# Patient Record
Sex: MALE | Race: ASIAN | NOT HISPANIC OR LATINO | Employment: FULL TIME | URBAN - METROPOLITAN AREA
[De-identification: names, ages, dates, MRNs, and addresses within clinical notes are randomized per-mention and may not be internally consistent; named-entity substitution may affect disease eponyms.]

---

## 2017-09-16 ENCOUNTER — TRANSFERRED RECORDS (OUTPATIENT)
Dept: HEALTH INFORMATION MANAGEMENT | Facility: CLINIC | Age: 32
End: 2017-09-16

## 2018-01-22 ENCOUNTER — TRANSFERRED RECORDS (OUTPATIENT)
Dept: HEALTH INFORMATION MANAGEMENT | Facility: CLINIC | Age: 33
End: 2018-01-22

## 2018-03-13 ENCOUNTER — OFFICE VISIT (OUTPATIENT)
Dept: URGENT CARE | Facility: URGENT CARE | Age: 33
End: 2018-03-13
Payer: COMMERCIAL

## 2018-03-13 VITALS
TEMPERATURE: 98.5 F | SYSTOLIC BLOOD PRESSURE: 120 MMHG | DIASTOLIC BLOOD PRESSURE: 72 MMHG | OXYGEN SATURATION: 98 % | HEART RATE: 77 BPM

## 2018-03-13 DIAGNOSIS — R07.0 THROAT PAIN: Primary | ICD-10-CM

## 2018-03-13 LAB
DEPRECATED S PYO AG THROAT QL EIA: ABNORMAL
SPECIMEN SOURCE: ABNORMAL

## 2018-03-13 PROCEDURE — 99214 OFFICE O/P EST MOD 30 MIN: CPT | Performed by: FAMILY MEDICINE

## 2018-03-13 PROCEDURE — 87880 STREP A ASSAY W/OPTIC: CPT | Performed by: FAMILY MEDICINE

## 2018-03-13 NOTE — LETTER
[facility]  [address] [facility] [address]  Phone: [phone]  Fax: [phone]    March 15, 2018        Darien Buitrago  [address] [facility] [address]          To whom it may concern:    RE: Darien Buitrago    Patient was seen and treated today at our clinic. Unable to work on 3/13 and 3/14/18.    Please contact me for questions or concerns.      Sincerely,        Monster Choi MD

## 2018-03-13 NOTE — PROGRESS NOTES
SUBJECTIVE:   Darien Buitrago is a 32 year old male presenting with a chief complaint of   Chief Complaint   Patient presents with     Urgent Care     Pharyngitis     Possible strep x2- chills, feels like there is something in the throat, fatigue       He is an established patient of [place].    Onset of symptoms was several weeks week(s) ago.  Course of illness is same.    Severity moderate  Current and Associated symptoms: fever, chills, cough - non-productive and sore throat  Treatment measures tried include Tylenol/Ibuprofen.  Predisposing factors include hx of strep.        Review of Systems   Constitutional: Positive for activity change, appetite change and fever.   HENT: Positive for sinus pressure and sore throat.    Respiratory: Positive for cough.    Cardiovascular: Negative.    Gastrointestinal: Negative.    Endocrine: Negative.        No past medical history on file.  Family History   Problem Relation Age of Onset     CEREBROVASCULAR DISEASE Paternal Grandfather      Current Outpatient Prescriptions   Medication Sig Dispense Refill     cefUROXime (CEFTIN) 500 MG tablet Take 1 tablet (500 mg) by mouth 2 times daily (Patient not taking: Reported on 3/13/2018) 14 tablet 0     Social History   Substance Use Topics     Smoking status: Never Smoker     Smokeless tobacco: Never Used     Alcohol use Yes      Comment: very rarely       OBJECTIVE  /72 (BP Location: Right arm, Patient Position: Chair, Cuff Size: Adult Regular)  Pulse 77  Temp 98.5  F (36.9  C) (Oral)  SpO2 98%    Physical Exam   Constitutional: He is oriented to person, place, and time. He appears well-developed.   HENT:   Head: Normocephalic and atraumatic.   Inferior turbs enlarged , oral is very red in the posterior.    Eyes: Conjunctivae and EOM are normal. Pupils are equal, round, and reactive to light.   Neck: Normal range of motion. No thyromegaly present.   Cardiovascular: Normal rate and regular rhythm.    Pulmonary/Chest: Effort  normal and breath sounds normal.   Abdominal: Soft. Bowel sounds are normal.   Musculoskeletal: Normal range of motion.   Lymphadenopathy:     He has cervical adenopathy.   Neurological: He is alert and oriented to person, place, and time. He has normal reflexes.   Skin: Skin is warm. There is erythema.       Labs:  Results for orders placed or performed in visit on 03/13/18   Rapid strep screen   Result Value Ref Range    Specimen Description Throat     Rapid Strep A Screen (A)      POSITIVE: Group A Streptococcal antigen detected by immunoassay.       X-Ray was not done.    ASSESSMENT:      ICD-10-CM    1. Throat pain R07.0 Rapid strep screen      2. Strep pharyngitis  3. Recurrent infection of strep  4. He has several medical concerns. He does not have a primary . We did discuss that he should be seen .   Medical Decision Making:    Differential Diagnosis:  URI Adult/Peds:  Croup and Sinusitis    Serious Comorbid Conditions:  Adult:  hx of strep    PLAN:    URI Adult:  Tylenol, Ibuprofen and Rx strep  augmentin    Followup:    If not improving or if condition worsens, follow up with your Primary Care Provider    There are no Patient Instructions on file for this visit.    25 minutes in exam and counseling. With greater than 50% of time in discussion of recurrent infection and need for PCP.

## 2018-03-13 NOTE — LETTER
[facility]  [address] [facility] [address]  Phone: [phone]  Fax: [phone]    March 13, 2018        Darien Buitrago  [address] [facility] [address]          To whom it may concern:    RE: Darien Buitrago    Patient was seen and treated today at our clinic. Unable to work on 3/13/18.    Please contact me for questions or concerns.      Sincerely,        Monster Choi MD

## 2018-03-14 ENCOUNTER — TELEPHONE (OUTPATIENT)
Dept: URGENT CARE | Facility: URGENT CARE | Age: 33
End: 2018-03-14

## 2018-03-14 NOTE — TELEPHONE ENCOUNTER
Wife calling to request letter be written for work missing on 3/13/18 and 3/14/18.  They would like to  andrew.  Informed Dr Choi is not in office but there is another provider.    Please advise on re-writing letter.    Renu Watkins RN, BSN

## 2018-03-15 ASSESSMENT — ENCOUNTER SYMPTOMS
FEVER: 1
CARDIOVASCULAR NEGATIVE: 1
GASTROINTESTINAL NEGATIVE: 1
SINUS PRESSURE: 1
COUGH: 1
ACTIVITY CHANGE: 1
APPETITE CHANGE: 1
SORE THROAT: 1
ENDOCRINE NEGATIVE: 1

## 2018-04-12 ENCOUNTER — OFFICE VISIT (OUTPATIENT)
Dept: URGENT CARE | Facility: URGENT CARE | Age: 33
End: 2018-04-12
Payer: COMMERCIAL

## 2018-04-12 VITALS
BODY MASS INDEX: 45.89 KG/M2 | HEIGHT: 63 IN | WEIGHT: 259 LBS | DIASTOLIC BLOOD PRESSURE: 72 MMHG | OXYGEN SATURATION: 98 % | SYSTOLIC BLOOD PRESSURE: 118 MMHG | RESPIRATION RATE: 16 BRPM | TEMPERATURE: 97.9 F | HEART RATE: 75 BPM

## 2018-04-12 DIAGNOSIS — H66.001 ACUTE SUPPURATIVE OTITIS MEDIA OF RIGHT EAR WITHOUT SPONTANEOUS RUPTURE OF TYMPANIC MEMBRANE, RECURRENCE NOT SPECIFIED: Primary | ICD-10-CM

## 2018-04-12 PROCEDURE — 99214 OFFICE O/P EST MOD 30 MIN: CPT | Performed by: NURSE PRACTITIONER

## 2018-04-12 RX ORDER — AMOXICILLIN 500 MG/1
500 CAPSULE ORAL 2 TIMES DAILY
Qty: 20 CAPSULE | Refills: 0 | Status: SHIPPED | OUTPATIENT
Start: 2018-04-12 | End: 2018-05-21

## 2018-04-12 NOTE — LETTER
Return to  Work Release    Date: 4/12/2018      Name: Darien Buitrago                       YOB: 1985      The patient was seen at: [facility], may return to work on 4/16/2018.          _________________________  ARACELI Tapia CNP

## 2018-04-12 NOTE — MR AVS SNAPSHOT
After Visit Summary   4/12/2018    Darien Buitrago    MRN: [id number]           Patient Information     Date Of Birth          1985        Visit Information        Provider Department      4/12/2018 6:35 PM Natividad Lawson APRN [facility]        Today's Diagnoses     Acute suppurative otitis media of right ear without spontaneous rupture of tympanic membrane, recurrence not specified    -  1      Care Instructions      Middle Ear Infection (Adult)  You have an infection of the middle ear, the space behind the eardrum. This is also called acute otitis media (AOM). Sometimes it is caused by the common cold. This is because congestion can block the internal passage (eustachian tube) that drains fluid from the middle ear. When the middle ear fills with fluid, bacteria can grow there and cause an infection. Oral antibiotics are used to treat this illness, not ear drops. Symptoms usually start to improve within 1 to 2 days of treatment.    Home care  The following are general care guidelines:    Finish all of the antibiotic medicine given, even though you may feel better after the first few days.    You may use over-the-counter medicine, such as acetaminophen or ibuprofen, to control pain and fever, unless something else was prescribed. If you have chronic liver or kidney disease or have ever had a stomach ulcer or gastrointestinal bleeding, talk with your healthcare provider before using these medicines. Do not give aspirin to anyone under 18 years of age who has a fever. It may cause severe illness or death.  Follow-up care  Follow up with your healthcare provider, or as advised, in 2 weeks if all symptoms have not gotten better, or if hearing doesn't go back to normal within 1 month.  When to seek medical advice  Call your healthcare provider right away if any of these occur:    Ear pain gets worse or does not improve after 3 days of treatment    Unusual drowsiness or  "confusion    Neck pain, stiff neck, or headache    Fluid or blood draining from the ear canal    Fever of 100.4 F (38 C) or as advised     Seizure  Date Last Reviewed: 2016-2017 [employer]. [address] [place] [address]. All rights reserved. This information is not intended as a substitute for professional medical care. Always follow your healthcare professional's instructions.                Follow-ups after your visit        Follow-up notes from your care team     Return if symptoms worsen or fail to improve.      Who to contact     If you have questions or need follow up information about today's clinic visit or your schedule please contact [facility] directly at [phone].  Normal or non-critical lab and imaging results will be communicated to you by [employer], letter or phone within 4 business days after the clinic has received the results. If you do not hear from us within 7 days, please contact the clinic through [employer]ob[employer] or phone. If you have a critical or abnormal lab result, we will notify you by phone as soon as possible.  Submit refill requests through [email or web address] or call your pharmacy and they will forward the refill request to us. Please allow 3 business days for your refill to be completed.          Additional Information About Your Visit        PlayMob[employer]art Information     [email or web address] lets you send messages to your doctor, view your test results, renew your prescriptions, schedule appointments and more. To sign up, go to [email or web address] . Click on \"Log in\" on the left side of the screen, which will take you to the Welcome page. Then click on \"Sign up Now\" on the right side of the page.     You will be asked to enter the access code listed below, as well as some personal information. Please follow the directions to create your username and password.     Your access code is: [email or web address]  Expires: 2018  7:31 PM     Your access code will  " "in 90 days. If you need help or a new code, please call your [place] clinic or [phone].        Care EveryWhere ID     This is your Care EveryWhere ID. This could be used by other organizations to access your [place] medical records  [id number]        Your Vitals Were     Pulse Temperature Respirations Height Pulse Oximetry BMI (Body Mass Index)    75 97.9  F (36.6  C) (Oral) 16 5' 3\" (1.6 m) 98% 45.88 kg/m2       Blood Pressure from Last 3 Encounters:   04/12/18 118/72   03/13/18 120/72   02/25/15 124/71    Weight from Last 3 Encounters:   04/12/18 259 lb (117.5 kg)   02/25/15 259 lb (117.5 kg)   04/13/09 205 lb (93 kg)              Today, you had the following     No orders found for display         Today's Medication Changes          These changes are accurate as of 4/12/18  7:31 PM.  If you have any questions, ask your nurse or doctor.               Start taking these medicines.        Dose/Directions    amoxicillin 500 MG capsule   Commonly known as:  AMOXIL   Used for:  Acute suppurative otitis media of right ear without spontaneous rupture of tympanic membrane, recurrence not specified        Dose:  500 mg   Take 1 capsule (500 mg) by mouth 2 times daily   Quantity:  20 capsule   Refills:  0            Where to get your medicines      These medications were sent to [facility] [id number] [place] [address] [id number] [place] [address] AT [address]  [address] [facility], [facility] [address]     Phone:  [phone]     amoxicillin 500 MG capsule                Primary Care Provider Fax #    Provider Not In System [phone]                Equal Access to Services     [facility] AH: Hadii feli Mendoza, wanevada luqadaha, qaybta kaaljodie harrison. So [facility] [phone].    ATENCIÓN: Si habla español, tiene a aggarwal disposición servicios gratuitos de asistencia lingüística. Llame al [phone].    We comply with applicable federal civil rights laws " and [place] laws. We do not discriminate on the basis of race, color, national origin, age, disability, sex, sexual orientation, or gender identity.            Thank you!     Thank you for choosing [facility]  for your care. Our goal is always to provide you with excellent care. Hearing back from our patients is one way we can continue to improve our services. Please take a few minutes to complete the written survey that you may receive in the mail after your visit with us. Thank you!             Your Updated Medication List - Protect others around you: Learn how to safely use, store and throw away your medicines at www.disposemymeds.org.          This list is accurate as of 4/12/18  7:31 PM.  Always use your most recent med list.                   Brand Name Dispense Instructions for use Diagnosis    amoxicillin 500 MG capsule    AMOXIL    20 capsule    Take 1 capsule (500 mg) by mouth 2 times daily    Acute suppurative otitis media of right ear without spontaneous rupture of tympanic membrane, recurrence not specified

## 2018-04-12 NOTE — NURSING NOTE
"Chief Complaint   Patient presents with     Ear Problem     right ear pain, pressure on right side, jaw pain, some dizziness, tight chest, slight ST, cold sore on lip and nose, cough x today,        Initial /72 (BP Location: Right arm, Patient Position: Chair, Cuff Size: Adult Large)  Pulse 75  Temp 97.9  F (36.6  C) (Oral)  Resp 16  Ht 5' 3\" (1.6 m)  Wt 259 lb (117.5 kg)  SpO2 98%  BMI 45.88 kg/m2 Estimated body mass index is 45.88 kg/(m^2) as calculated from the following:    Height as of this encounter: 5' 3\" (1.6 m).    Weight as of this encounter: 259 lb (117.5 kg).  Medication Reconciliation: julia Lucas CMA      "

## 2018-04-13 NOTE — PROGRESS NOTES
"SUBJECTIVE: Darien Buitrago is a 33 year old male with 5 days of nasal congestion, cough with new onset 1 day of right sided ear pain. He does endorse ill contacts tho home with 2 children ill. Denies any OTC medications. Denies any fevers or chills. Reports mild fatigue, but no changes in appetite, sleep, bowel or bladder habits. Patient was last treated with antibiotics about 2 weeks previous for strep pharyngitis.     OBJECTIVE:  /72 (BP Location: Right arm, Patient Position: Chair, Cuff Size: Adult Large)  Pulse 75  Temp 97.9  F (36.6  C) (Oral)  Resp 16  Ht 5' 3\" (1.6 m)  Wt 259 lb (117.5 kg)  SpO2 98%  BMI 45.88 kg/m2   General: Alert, oriented in no acute distress  Ears:RIGHT TM dull, mucoid fluid with erythema, LEFT TM clear with good light reflex, with serous fluid no erythema; canals clear without erythema.  Nasal: Bilateral congestion with thin clear mucus Oropharynx: Mild pharyngeal erythema, no tonsilar exudate   Neck: Supple with no adenopathy.   CV: S1, S2 auscultated with regular rate and rhythm, no gallops, murmurs or rubs   Lungs: Bilateral lobes clear throughout without wheezes, crackles, or rhonchi noted    ASSESSMENT: RIGHT sided Otitis Media    PLAN: Discussed ear infection diagnosis, plan and treatment with antibiotics, advised completion of full course and follow up if symptoms do not improve past 3 days. OTC tylenol or NSAIDs as needed for pain or fever. Letter for work provided. Patient agreed to the plan of care.       Dena Lawson, APRN, CNP        "

## 2018-04-13 NOTE — PATIENT INSTRUCTIONS
Middle Ear Infection (Adult)  You have an infection of the middle ear, the space behind the eardrum. This is also called acute otitis media (AOM). Sometimes it is caused by the common cold. This is because congestion can block the internal passage (eustachian tube) that drains fluid from the middle ear. When the middle ear fills with fluid, bacteria can grow there and cause an infection. Oral antibiotics are used to treat this illness, not ear drops. Symptoms usually start to improve within 1 to 2 days of treatment.    Home care  The following are general care guidelines:    Finish all of the antibiotic medicine given, even though you may feel better after the first few days.    You may use over-the-counter medicine, such as acetaminophen or ibuprofen, to control pain and fever, unless something else was prescribed. If you have chronic liver or kidney disease or have ever had a stomach ulcer or gastrointestinal bleeding, talk with your healthcare provider before using these medicines. Do not give aspirin to anyone under 18 years of age who has a fever. It may cause severe illness or death.  Follow-up care  Follow up with your healthcare provider, or as advised, in 2 weeks if all symptoms have not gotten better, or if hearing doesn't go back to normal within 1 month.  When to seek medical advice  Call your healthcare provider right away if any of these occur:    Ear pain gets worse or does not improve after 3 days of treatment    Unusual drowsiness or confusion    Neck pain, stiff neck, or headache    Fluid or blood draining from the ear canal    Fever of 100.4 F (38 C) or as advised     Seizure  Date Last Reviewed: 6/1/2016 2000-2017 [employer]. [address] [place] [address], [place] [address]. All rights reserved. This information is not intended as a substitute for professional medical care. Always follow your healthcare professional's instructions.

## 2018-05-21 ENCOUNTER — OFFICE VISIT (OUTPATIENT)
Dept: FAMILY MEDICINE | Facility: CLINIC | Age: 33
End: 2018-05-21
Payer: COMMERCIAL

## 2018-05-21 VITALS
SYSTOLIC BLOOD PRESSURE: 108 MMHG | TEMPERATURE: 99.3 F | BODY MASS INDEX: 42.7 KG/M2 | DIASTOLIC BLOOD PRESSURE: 80 MMHG | HEART RATE: 88 BPM | RESPIRATION RATE: 14 BRPM | OXYGEN SATURATION: 100 % | HEIGHT: 63 IN | WEIGHT: 241 LBS

## 2018-05-21 DIAGNOSIS — R10.11 RUQ ABDOMINAL PAIN: Primary | ICD-10-CM

## 2018-05-21 DIAGNOSIS — R19.7 DIARRHEA, UNSPECIFIED TYPE: ICD-10-CM

## 2018-05-21 LAB
ALBUMIN SERPL-MCNC: 4.2 G/DL (ref 3.4–5)
ALP SERPL-CCNC: 44 U/L (ref 40–150)
ALT SERPL W P-5'-P-CCNC: 38 U/L (ref 0–70)
AMYLASE SERPL-CCNC: 54 U/L (ref 30–110)
ANION GAP SERPL CALCULATED.3IONS-SCNC: 8 MMOL/L (ref 3–14)
AST SERPL W P-5'-P-CCNC: 21 U/L (ref 0–45)
BASOPHILS # BLD AUTO: 0 10E9/L (ref 0–0.2)
BASOPHILS NFR BLD AUTO: 0.1 %
BILIRUB SERPL-MCNC: 1.1 MG/DL (ref 0.2–1.3)
BUN SERPL-MCNC: 17 MG/DL (ref 7–30)
CALCIUM SERPL-MCNC: 8.1 MG/DL (ref 8.5–10.1)
CHLORIDE SERPL-SCNC: 106 MMOL/L (ref 94–109)
CO2 SERPL-SCNC: 24 MMOL/L (ref 20–32)
CREAT SERPL-MCNC: 0.86 MG/DL (ref 0.66–1.25)
DIFFERENTIAL METHOD BLD: NORMAL
EOSINOPHIL # BLD AUTO: 0.2 10E9/L (ref 0–0.7)
EOSINOPHIL NFR BLD AUTO: 2 %
ERYTHROCYTE [DISTWIDTH] IN BLOOD BY AUTOMATED COUNT: 13.4 % (ref 10–15)
GFR SERPL CREATININE-BSD FRML MDRD: >90 ML/MIN/1.7M2
GLUCOSE SERPL-MCNC: 85 MG/DL (ref 70–99)
HCT VFR BLD AUTO: 45.3 % (ref 40–53)
HGB BLD-MCNC: 15.1 G/DL (ref 13.3–17.7)
LIPASE SERPL-CCNC: 123 U/L (ref 73–393)
LYMPHOCYTES # BLD AUTO: 1.3 10E9/L (ref 0.8–5.3)
LYMPHOCYTES NFR BLD AUTO: 15.2 %
MCH RBC QN AUTO: 31.9 PG (ref 26.5–33)
MCHC RBC AUTO-ENTMCNC: 33.3 G/DL (ref 31.5–36.5)
MCV RBC AUTO: 96 FL (ref 78–100)
MONOCYTES # BLD AUTO: 0.8 10E9/L (ref 0–1.3)
MONOCYTES NFR BLD AUTO: 9.4 %
NEUTROPHILS # BLD AUTO: 6.3 10E9/L (ref 1.6–8.3)
NEUTROPHILS NFR BLD AUTO: 73.3 %
PLATELET # BLD AUTO: 154 10E9/L (ref 150–450)
POTASSIUM SERPL-SCNC: 3.6 MMOL/L (ref 3.4–5.3)
PROT SERPL-MCNC: 8.7 G/DL (ref 6.8–8.8)
RBC # BLD AUTO: 4.73 10E12/L (ref 4.4–5.9)
SODIUM SERPL-SCNC: 138 MMOL/L (ref 133–144)
WBC # BLD AUTO: 8.6 10E9/L (ref 4–11)

## 2018-05-21 PROCEDURE — 80053 COMPREHEN METABOLIC PANEL: CPT | Performed by: NURSE PRACTITIONER

## 2018-05-21 PROCEDURE — 85025 COMPLETE CBC W/AUTO DIFF WBC: CPT | Performed by: NURSE PRACTITIONER

## 2018-05-21 PROCEDURE — 82150 ASSAY OF AMYLASE: CPT | Performed by: NURSE PRACTITIONER

## 2018-05-21 PROCEDURE — 83690 ASSAY OF LIPASE: CPT | Performed by: NURSE PRACTITIONER

## 2018-05-21 PROCEDURE — 99214 OFFICE O/P EST MOD 30 MIN: CPT | Performed by: NURSE PRACTITIONER

## 2018-05-21 PROCEDURE — 36415 COLL VENOUS BLD VENIPUNCTURE: CPT | Performed by: NURSE PRACTITIONER

## 2018-05-21 NOTE — PROGRESS NOTES
SUBJECTIVE:   Darien Buitrago is a 33 year old male who presents to clinic today with wife and two children for the following health issues:    Abdominal Pain      Duration: 4 days    Description (location/character/radiation): R side       Associated flank pain: None    Intensity:  mild    Accompanying signs and symptoms:        Fever/Chills: YES       Gas/Bloating: YES       Nausea/vomiting: no        Diarrhea: YES       Dysuria or Hematuria: no     History (previous similar pain/trauma/previous testing): none    Precipitating or alleviating factors:       Pain worse with eating/BM/urination: no       Pain relieved by BM: no     Therapies tried and outcome: None    LMP:  not applicable    Right side abdominal pain has had intermittent pain for several years, this episode began 05/17, resolved 05/18 during the work day then presented again. He describes it as a constant discomfort.   Diarrhea:  Last night experienced significant fatigue, chills, lack of appetite and diarrhea. Has had 6-8 watery stools since that time with increased flatulence. He's unsure if related to heat exhaustion. Patient's brother in-law reported having increased BM frequency over past week. Has had poor fluid intake, no appetite this morning.   History of increased liver enzymes. Denies vomiting, drinking alcohol, taking Tylenol. Nonsmoker. Denies history of abdominal complications.      Problem list and histories reviewed & adjusted, as indicated.  Additional history: as documented  Patient Active Problem List   Diagnosis     CARDIOVASCULAR SCREENING; LDL GOAL LESS THAN 160     History reviewed. No pertinent surgical history.    Social History   Substance Use Topics     Smoking status: Never Smoker     Smokeless tobacco: Never Used     Alcohol use Yes      Comment: very rarely     Family History   Problem Relation Age of Onset     CEREBROVASCULAR DISEASE Paternal Grandfather          No current outpatient prescriptions on file.     No Known  "Allergies    Reviewed and updated as needed this visit by clinical staff       Reviewed and updated as needed this visit by Provider         ROS:  Constitutional, cardiovascular, pulmonary, GI, and psych systems are negative, except as otherwise noted.    This document serves as a record of the services and decisions personally performed and made by Susan Haase, CNP. It was created on her behalf by Tatiana Bryson, a trained medical scribe. The creation of this document is based on the provider's statements to the medical scribe.  Tatiana Bryson 11:41 AM May 21, 2018  OBJECTIVE:   /80 (BP Location: Left arm, Patient Position: Chair, Cuff Size: Adult Large)  Pulse 88  Temp 99.3  F (37.4  C) (Oral)  Resp 14  Ht 1.6 m (5' 3\")  Wt 109.3 kg (241 lb)  SpO2 100%  BMI 42.69 kg/m2  Body mass index is 42.69 kg/(m^2).  GENERAL: healthy, alert and no distress  RESP: lungs clear to auscultation - no rales, rhonchi or wheezes  CV: regular rate and rhythm, normal S1 S2, no S3 or S4, no murmur, click or rub, no peripheral edema   ABDOMEN: soft, nontender, no hepatosplenomegaly, no masses and bowel sounds normal  PSYCH: mentation appears normal, affect normal/bright    ASSESSMENT/PLAN:   Darien was seen today for abdominal pain.    Diagnoses and all orders for this visit:    RUQ abdominal pain: normal exam, with intermittent nature will obtain labs for liver, gallbladder.   -     CBC with platelets differential  -     Comprehensive metabolic panel  -     Amylase  -     Lipase    Diarrhea, unspecified type: new onset, suggested bland diet for next 24 hour, increase fluid intake.        Follow up visit in 2 weeks, sooner as needed.   The information in this document, created by the medical scribe for me, accurately reflects the services I personally performed and the decisions made by me. I have reviewed and approved this document for accuracy prior to leaving the patient care area.  May 21, 2018 11:46 AM  Susan Haase, APRN " [facility]

## 2018-05-21 NOTE — MR AVS SNAPSHOT
"              After Visit Summary   2018    Darien Buitrago    MRN: [id number]           Patient Information     Date Of Birth          1985        Visit Information        Provider Department      2018 11:15 AM Haase, Susan Rachele, APRN CNP [facility]        Today's Diagnoses     RUQ abdominal pain    -  1       Follow-ups after your visit        Follow-up notes from your care team     Return in about 2 weeks (around 2018).      Who to contact     If you have questions or need follow up information about today's clinic visit or your schedule please contact [facility] directly at [phone].  Normal or non-critical lab and imaging results will be communicated to you by MyChart, letter or phone within 4 business days after the clinic has received the results. If you do not hear from us within 7 days, please contact the clinic through MyChart or phone. If you have a critical or abnormal lab result, we will notify you by phone as soon as possible.  Submit refill requests through [facility] [employer] or call your pharmacy and they will forward the refill request to us. Please allow 3 business days for your refill to be completed.          Additional Information About Your Visit        MyChart Information     Return Path lets you send messages to your doctor, view your test results, renew your prescriptions, schedule appointments and more. To sign up, go to [email or web address] . Click on \"Log in\" on the left side of the screen, which will take you to the Welcome page. Then click on \"Sign up Now\" on the right side of the page.     You will be asked to enter the access code listed below, as well as some personal information. Please follow the directions to create your username and password.     Your access code is: [email or web address]  Expires: 2018  7:31 PM     Your access code will  in 90 days. If you need help or a new code, please call your [facility] or " "[phone].        Care EveryWhere ID     This is your Care EveryWhere ID. This could be used by other organizations to access your [facility] medical records  [id number]        Your Vitals Were     Pulse Temperature Respirations Height Pulse Oximetry BMI (Body Mass Index)    88 99.3  F (37.4  C) (Oral) 14 5' 3\" (1.6 m) 100% 42.69 kg/m2       Blood Pressure from Last 3 Encounters:   05/21/18 108/80   04/12/18 118/72   03/13/18 120/72    Weight from Last 3 Encounters:   05/21/18 241 lb (109.3 kg)   04/12/18 259 lb (117.5 kg)   02/25/15 259 lb (117.5 kg)              We Performed the Following     Amylase     CBC with platelets differential     Comprehensive metabolic panel     Lipase          Today's Medication Changes          These changes are accurate as of 5/21/18 11:51 AM.  If you have any questions, ask your nurse or doctor.               Stop taking these medicines if you haven't already. Please contact your care team if you have questions.     amoxicillin 500 MG capsule   Commonly known as:  AMOXIL   Stopped by:  Haase, Susan Rachele, ARACELI CNP                    Primary Care Provider Fax #    Physician No Ref-Primary [phone]       No address on file        Equal Access to Services     FIFI LEWIS : Heide perezo Kelly, waaxda luqadaha, qaybta kaalmada adehardyyada, jodie weiner. So [facility] [phone].    ATENCIÓN: Si habla español, tiene a aggarwal disposición servicios gratuitos de asistencia lingüística. Llame al [phone].    We comply with applicable federal civil rights laws and [place] laws. We do not discriminate on the basis of race, color, national origin, age, disability, sex, sexual orientation, or gender identity.            Thank you!     Thank you for choosing [facility]  for your care. Our goal is always to provide you with excellent care. Hearing back from our patients is one way we can continue to improve our services. Please take a " few minutes to complete the written survey that you may receive in the mail after your visit with us. Thank you!             Your Updated Medication List - Protect others around you: Learn how to safely use, store and throw away your medicines at www.disposemymeds.org.      Notice  As of 5/21/2018 11:51 AM    You have not been prescribed any medications.

## 2018-05-21 NOTE — LETTER
[facility]  [address] [place] [address]  Phone: [phone]    May 21, 2018        Darien Buitrago  [address] [place] [id number]          To whom it may concern:    RE: Darien Buitrago    Patient was seen and treated today at our clinic. Please excuse him from work tonight due to illness.     Please contact me for questions or concerns.      Sincerely,        Susan Haase, APRN CNP

## 2018-05-21 NOTE — LETTER
May 22, 2018      Darien Buitrago  [address] [facility] [address]        Dear ,    We are writing to inform you of your test results.    Your lab results are as below:  1)  CBC (checks for anemia and infection) was normal.   2)  Amylase, lipase and liver enzymes are all normal.   3)  Glucose was normal at 85 (normal fasting is <100).    If you have any questions do not hesitate to call the clinic to discuss the results with me further.     Resulted Orders   CBC with platelets differential   Result Value Ref Range    WBC 8.6 4.0 - 11.0 10e9/L    RBC Count 4.73 4.4 - 5.9 10e12/L    Hemoglobin 15.1 13.3 - 17.7 g/dL    Hematocrit 45.3 40.0 - 53.0 %    MCV 96 78 - 100 fl    MCH 31.9 26.5 - 33.0 pg    MCHC 33.3 31.5 - 36.5 g/dL    RDW 13.4 10.0 - 15.0 %    Platelet Count 154 150 - 450 10e9/L    Diff Method Automated Method     % Neutrophils 73.3 %    % Lymphocytes 15.2 %    % Monocytes 9.4 %    % Eosinophils 2.0 %    % Basophils 0.1 %    Absolute Neutrophil 6.3 1.6 - 8.3 10e9/L    Absolute Lymphocytes 1.3 0.8 - 5.3 10e9/L    Absolute Monocytes 0.8 0.0 - 1.3 10e9/L    Absolute Eosinophils 0.2 0.0 - 0.7 10e9/L    Absolute Basophils 0.0 0.0 - 0.2 10e9/L   Comprehensive metabolic panel   Result Value Ref Range    Sodium 138 133 - 144 mmol/L    Potassium 3.6 3.4 - 5.3 mmol/L    Chloride 106 94 - 109 mmol/L    Carbon Dioxide 24 20 - 32 mmol/L    Anion Gap 8 3 - 14 mmol/L    Glucose 85 70 - 99 mg/dL    Urea Nitrogen 17 7 - 30 mg/dL    Creatinine 0.86 0.66 - 1.25 mg/dL    GFR Estimate >90 >60 mL/min/1.7m2      Comment:      Non  GFR Calc    GFR Estimate If Black >90 >60 mL/min/1.7m2      Comment:       GFR Calc    Calcium 8.1 (L) 8.5 - 10.1 mg/dL    Bilirubin Total 1.1 0.2 - 1.3 mg/dL    Albumin 4.2 3.4 - 5.0 g/dL    Protein Total 8.7 6.8 - 8.8 g/dL    Alkaline Phosphatase 44 40 - 150 U/L    ALT 38 0 - 70 U/L    AST 21 0 - 45 U/L   Amylase   Result Value Ref Range    Amylase 54 30  - 110 U/L   Lipase   Result Value Ref Range    Lipase 123 73 - 393 U/L       If you have any questions or concerns, please call the clinic at the number listed above.       Sincerely,        Susan Haase, APRN CNP/AL

## 2018-07-24 ENCOUNTER — RADIANT APPOINTMENT (OUTPATIENT)
Dept: GENERAL RADIOLOGY | Facility: CLINIC | Age: 33
End: 2018-07-24
Attending: FAMILY MEDICINE
Payer: COMMERCIAL

## 2018-07-24 ENCOUNTER — OFFICE VISIT (OUTPATIENT)
Dept: FAMILY MEDICINE | Facility: CLINIC | Age: 33
End: 2018-07-24
Payer: COMMERCIAL

## 2018-07-24 VITALS
HEART RATE: 69 BPM | WEIGHT: 256 LBS | HEIGHT: 63 IN | RESPIRATION RATE: 16 BRPM | SYSTOLIC BLOOD PRESSURE: 106 MMHG | BODY MASS INDEX: 45.36 KG/M2 | DIASTOLIC BLOOD PRESSURE: 66 MMHG | TEMPERATURE: 98.3 F | OXYGEN SATURATION: 100 %

## 2018-07-24 DIAGNOSIS — J00 CORYZA: Primary | ICD-10-CM

## 2018-07-24 DIAGNOSIS — E66.01 MORBID OBESITY (H): ICD-10-CM

## 2018-07-24 DIAGNOSIS — M79.641 PAIN OF RIGHT HAND: ICD-10-CM

## 2018-07-24 DIAGNOSIS — R05.9 COUGH: ICD-10-CM

## 2018-07-24 DIAGNOSIS — R94.2 ABNORMAL PULMONARY FUNCTION TEST: ICD-10-CM

## 2018-07-24 DIAGNOSIS — G47.33 OSA (OBSTRUCTIVE SLEEP APNEA): ICD-10-CM

## 2018-07-24 LAB
FEF 25/75: 37
FEV-1: 63
FEV1/FVC: 41
FVC: 158

## 2018-07-24 PROCEDURE — 99214 OFFICE O/P EST MOD 30 MIN: CPT | Mod: 25 | Performed by: FAMILY MEDICINE

## 2018-07-24 PROCEDURE — 71046 X-RAY EXAM CHEST 2 VIEWS: CPT

## 2018-07-24 PROCEDURE — 94010 BREATHING CAPACITY TEST: CPT | Performed by: FAMILY MEDICINE

## 2018-07-24 RX ORDER — FLUTICASONE PROPIONATE 50 MCG
1-2 SPRAY, SUSPENSION (ML) NASAL DAILY
Qty: 1 BOTTLE | Refills: 11 | Status: SHIPPED | OUTPATIENT
Start: 2018-07-24 | End: 2020-01-10

## 2018-07-24 RX ORDER — PREDNISONE 20 MG/1
40 TABLET ORAL DAILY
Qty: 20 TABLET | Refills: 0 | Status: SHIPPED | OUTPATIENT
Start: 2018-07-24 | End: 2018-08-03

## 2018-07-24 NOTE — PROGRESS NOTES
"  SUBJECTIVE:   Darien Buitrago is a 33 year old male who presents to clinic today for the following health issues:      Acute Illness   Acute illness concerns: cough   Onset: 1.5 weeks     Fever: no     Chills/Sweats: no     Headache (location?): no     Sinus Pressure:no    Conjunctivitis:  no    Ear Pain: no    Rhinorrhea: YES    Congestion: YES    Sore Throat: no      Cough: YES-non-productive    Wheeze: no     Decreased Appetite: no     Nausea: no     Vomiting: no     Diarrhea:  no     Dysuria/Freq.: no     Fatigue/Achiness: YES    Sick/Strep Exposure: no      Therapies Tried and outcome: none          Problem list and histories reviewed & adjusted, as indicated.  Additional history:         Reviewed and updated as needed this visit by clinical staff       Reviewed and updated as needed this visit by Provider         Lila  (primary encounter diagnosis)  Cough he gets this every year about this time, considers it allergies  Pain of right hand @ A2 pulley IV, exacerbated by clamp release technique at work. He is trying various modifications  CAYDEN (obstructive sleep apnea) he claims compliance    SOC non smoker,     ROS no fever CP edema, no trigger phenomenon    /66 (BP Location: Right arm, Patient Position: Chair, Cuff Size: Adult Large)  Pulse 69  Temp 98.3  F (36.8  C) (Oral)  Resp 16  Ht 5' 3\" (1.6 m)  Wt 256 lb (116.1 kg)  SpO2 100%  BMI 45.35 kg/m2  Coryza  CHEST: Clear to auscultation, respirations unlabored  posterior pharyngeal lymphoid streaks.  No edema  CXR neg  Results for orders placed or performed in visit on 07/24/18   Spirometry, Breathing Capacity: Normal Order, Clinic Performed   Result Value Ref Range    FEV-1 63          FEV1/FVC 41     FEF 25/75 37      ASSESSMENT / PLAN:  (J34.89) Coryza  (primary encounter diagnosis)  Comment: allergic vs viral  Plan: fluticasone (FLONASE) 50 MCG/ACT spray            (G47.33) CAYDEN (obstructive sleep apnea)  Comment: may contribute to " below  Plan: pos strokes for compliance    (R05) Cough  Comment: broaden data base    Plan: Spirometry, Breathing Capacity: Normal Order,         Clinic Performed            (M79.641) Pain of right hand  Comment: I suspect nascent tendon nodule development  Plan: discussed options. He declines hand therapy, will continue to work on modifications of work technique    (R94.2) Abnormal pulmonary function test  Comment: I suspect silent asthma  Plan: XR Chest 2 Views, predniSONE (DELTASONE) 20 MG         tablet        *    (E66.01) Morbid obesity (H)  Comment: records updated  Plan:           Mychal Connelly MD

## 2018-07-24 NOTE — MR AVS SNAPSHOT
"              After Visit Summary   7/24/2018    Darien Buitrago    MRN: [id number]           Patient Information     Date Of Birth          1985        Visit Information        Provider Department      7/24/2018 9:15 AM Mychal Connelly MD [facility]        Today's Diagnoses     Coryza    -  1    CAYDEN (obstructive sleep apnea)        Cough        Pain of right hand        Abnormal pulmonary function test        Morbid obesity (H)           Follow-ups after your visit        Follow-up notes from your care team     Return in about 2 weeks (around 8/7/2018) for recheck.      Your next 10 appointments already scheduled     Aug 07, 2018  9:30 AM CDT   SHORT with Mychal Connelly MD   [facility] ([facility]    [address] [place] [address] [phone]              Who to contact     If you have questions or need follow up information about today's clinic visit or your schedule please contact [facility] directly at [phone].  Normal or non-critical lab and imaging results will be communicated to you by MyChart, letter or phone within 4 business days after the clinic has received the results. If you do not hear from us within 7 days, please contact the clinic through invinohart or phone. If you have a critical or abnormal lab result, we will notify you by phone as soon as possible.  Submit refill requests through [employer] or call your pharmacy and they will forward the refill request to us. Please allow 3 business days for your refill to be completed.          Additional Information About Your Visit        Care EveryWhere ID     This is your Care EveryWhere ID. This could be used by other organizations to access your [place] medical records  [id number]        Your Vitals Were     Pulse Temperature Respirations Height Pulse Oximetry BMI (Body Mass Index)    69 98.3  F (36.8  C) (Oral) 16 5' 3\" (1.6 m) 100% 45.35 kg/m2       " Blood Pressure from Last 3 Encounters:   07/24/18 106/66   05/21/18 108/80   04/12/18 118/72    Weight from Last 3 Encounters:   07/24/18 256 lb (116.1 kg)   05/21/18 241 lb (109.3 kg)   04/12/18 259 lb (117.5 kg)              We Performed the Following     Spirometry, Breathing Capacity: Normal Order, Clinic Performed          Today's Medication Changes          These changes are accurate as of 7/24/18 11:59 PM.  If you have any questions, ask your nurse or doctor.               Start taking these medicines.        Dose/Directions    fluticasone 50 MCG/ACT spray   Commonly known as:  FLONASE   Used for:  Coryza   Started by:  Mychal Connelly MD        Dose:  1-2 spray   Spray 1-2 sprays into both nostrils daily   Quantity:  1 Bottle   Refills:  11       predniSONE 20 MG tablet   Commonly known as:  DELTASONE   Used for:  Abnormal pulmonary function test   Started by:  Mychal Connelly MD        Dose:  40 mg   Take 2 tablets (40 mg) by mouth daily for 10 days   Quantity:  20 tablet   Refills:  0            Where to get your medicines      These medications were sent to [facility] [address] [place] [address] AT [address]  [address] [facility] [address]     Phone:  [phone]     fluticasone 50 MCG/ACT spray    predniSONE 20 MG tablet                Primary Care Provider Office Phone # Fax #    Jacquelyn EUFEMIA Vasques [phone] [address] [facility] [address]        Equal Access to Services     [facility]: Hadii feli perezo Sodasha, waaxda luqadaha, qaybta kaalmada abisai, jodie weiner. So [facility] [phone].    ATENCIÓN: Si habla español, tiene a aggarwal disposición servicios gratuitos de asistencia lingüística. Llame al [phone].    We comply with applicable federal civil rights laws and [place] laws. We do not discriminate on the basis of race, color, national origin, age, disability, sex, sexual  orientation, or gender identity.            Thank you!     Thank you for choosing [facility]  for your care. Our goal is always to provide you with excellent care. Hearing back from our patients is one way we can continue to improve our services. Please take a few minutes to complete the written survey that you may receive in the mail after your visit with us. Thank you!             Your Updated Medication List - Protect others around you: Learn how to safely use, store and throw away your medicines at www.disposemymeds.org.          This list is accurate as of 7/24/18 11:59 PM.  Always use your most recent med list.                   Brand Name Dispense Instructions for use Diagnosis    fluticasone 50 MCG/ACT spray    FLONASE    1 Bottle    Spray 1-2 sprays into both nostrils daily    Coryza       predniSONE 20 MG tablet    DELTASONE    20 tablet    Take 2 tablets (40 mg) by mouth daily for 10 days    Abnormal pulmonary function test

## 2018-07-25 PROBLEM — E66.01 MORBID OBESITY (H): Status: ACTIVE | Noted: 2018-07-25

## 2018-07-25 PROBLEM — R94.2 ABNORMAL PULMONARY FUNCTION TEST: Status: ACTIVE | Noted: 2018-07-25

## 2019-03-05 ENCOUNTER — OFFICE VISIT (OUTPATIENT)
Dept: FAMILY MEDICINE | Facility: CLINIC | Age: 34
End: 2019-03-05
Payer: COMMERCIAL

## 2019-03-05 VITALS
HEART RATE: 93 BPM | WEIGHT: 256 LBS | BODY MASS INDEX: 45.36 KG/M2 | HEIGHT: 63 IN | DIASTOLIC BLOOD PRESSURE: 72 MMHG | TEMPERATURE: 101.7 F | OXYGEN SATURATION: 96 % | SYSTOLIC BLOOD PRESSURE: 123 MMHG | RESPIRATION RATE: 16 BRPM

## 2019-03-05 DIAGNOSIS — R07.0 THROAT PAIN: ICD-10-CM

## 2019-03-05 DIAGNOSIS — J10.1 INFLUENZA A: Primary | ICD-10-CM

## 2019-03-05 LAB
DEPRECATED S PYO AG THROAT QL EIA: NORMAL
FLUAV+FLUBV AG SPEC QL: NEGATIVE
FLUAV+FLUBV AG SPEC QL: POSITIVE
SPECIMEN SOURCE: ABNORMAL
SPECIMEN SOURCE: NORMAL

## 2019-03-05 PROCEDURE — 87081 CULTURE SCREEN ONLY: CPT | Performed by: PHYSICIAN ASSISTANT

## 2019-03-05 PROCEDURE — 87804 INFLUENZA ASSAY W/OPTIC: CPT | Performed by: PHYSICIAN ASSISTANT

## 2019-03-05 PROCEDURE — 87880 STREP A ASSAY W/OPTIC: CPT | Performed by: PHYSICIAN ASSISTANT

## 2019-03-05 PROCEDURE — 99213 OFFICE O/P EST LOW 20 MIN: CPT | Performed by: PHYSICIAN ASSISTANT

## 2019-03-05 RX ORDER — OSELTAMIVIR PHOSPHATE 75 MG/1
75 CAPSULE ORAL 2 TIMES DAILY
Qty: 10 CAPSULE | Refills: 0 | Status: SHIPPED | OUTPATIENT
Start: 2019-03-05 | End: 2019-03-10

## 2019-03-05 ASSESSMENT — MIFFLIN-ST. JEOR: SCORE: 1996.34

## 2019-03-05 NOTE — PATIENT INSTRUCTIONS
Patient Education     Influenza (Adult)    Influenza is also called the flu. It is a viral illness that affects the air passages of your lungs. It is different from the common cold. The flu can easily be passed from one to person to another. It may be spread through the air by coughing and sneezing. Or it can be spread by touching the sick person and then touching your own eyes, nose, or mouth.  The flu starts 1 to 3 days after you are exposed to the flu virus. It may last for 1 to 2 weeks but many people feel tired or fatigued for many weeks afterward. You usually don t need to take antibiotics unless you have a complication. This might be an ear or sinus infection or pneumonia.  Symptoms of the flu may be mild or severe. They can include extreme tiredness (wanting to stay in bed all day), chills, fevers, muscle aches, soreness with eye movement, headache, and a dry, hacking cough.  Home care  Follow these guidelines when caring for yourself at home:    Avoid being around cigarette smoke, whether yours or other people s.    Acetaminophen or ibuprofen will help ease your fever, muscle aches, and headache. Don t give aspirin to anyone younger than 18 who has the flu. Aspirin can harm the liver.    Nausea and loss of appetite are common with the flu. Eat light meals. Drink 6 to 8 glasses of liquids every day. Good choices are water, sport drinks, soft drinks without caffeine, juices, tea, and soup. Extra fluids will also help loosen secretions in your nose and lungs.    Over-the-counter cold medicines will not make the flu go away faster. But the medicines may help with coughing, sore throat, and congestion in your nose and sinuses. Don t use a decongestant if you have high blood pressure.    Stay home until your fever has been gone for at least 24 hours without using medicine to reduce fever.  Follow-up care  Follow up with your healthcare provider, or as advised, if you are not getting better over the next  week.  If you are age 65 or older, talk with your provider about getting a pneumococcal vaccine every 5 years. You should also get this vaccine if you have chronic asthma or COPD. All adults should get a flu vaccine every fall. Ask your provider about this.  When to seek medical advice  Call your healthcare provider right away if any of these occur:    Cough with lots of colored mucus (sputum) or blood in your mucus    Chest pain, shortness of breath, wheezing, or trouble breathing    Severe headache, or face, neck, or ear pain    New rash with fever    Fever of 100.4 F (38 C) or higher, or as directed by your healthcare provider    Confusion, behavior change, or seizure    Severe weakness or dizziness    You get a new fever or cough after getting better for a few days  Date Last Reviewed: 1/1/2017 2000-2018 [employer]. [address] [place] [address], [place] [address]. All rights reserved. This information is not intended as a substitute for professional medical care. Always follow your healthcare professional's instructions.

## 2019-03-05 NOTE — PROGRESS NOTES
SUBJECTIVE:   Darien Buitrago is a 33 year old male who presents to clinic today for the following health issues:      Acute Illness   Acute illness concerns: Fever   Onset: Sunday night but worse yesterday     Fever: YES up to 101.7 here and not checked at home    Chills/Sweats: no    Headache (location?): YES    Sinus Pressure:no    Conjunctivitis:  no    Ear Pain: no    Rhinorrhea: no    Congestion: YES    Sore Throat: YES     Cough: YES-non-productive    Wheeze: YES    Decreased Appetite: YES    Nausea: YES    Vomiting: no     Diarrhea:  no but stomach aches     Dysuria/Freq.: no     Fatigue/Achiness: YES    Sick/Strep Exposure: no      Therapies Tried and outcome: took tylenol         He did not get a flu shot this year.        Problem list and histories reviewed & adjusted, as indicated.  Additional history: as documented    Patient Active Problem List   Diagnosis     CARDIOVASCULAR SCREENING; LDL GOAL LESS THAN 160     CAYDEN (obstructive sleep apnea)     Pain of right hand     Abnormal pulmonary function test     Morbid obesity (H)     History reviewed. No pertinent surgical history.    Social History     Tobacco Use     Smoking status: Never Smoker     Smokeless tobacco: Never Used   Substance Use Topics     Alcohol use: Yes     Comment: very rarely     Family History   Problem Relation Age of Onset     Cerebrovascular Disease Paternal Grandfather          Current Outpatient Medications   Medication Sig Dispense Refill     fluticasone (FLONASE) 50 MCG/ACT spray Spray 1-2 sprays into both nostrils daily 1 Bottle 11     No Known Allergies    Reviewed and updated as needed this visit by clinical staff       Reviewed and updated as needed this visit by Provider         ROS:  Constitutional, HEENT, cardiovascular, pulmonary, gi and gu systems are negative, except as otherwise noted.    OBJECTIVE:     /72 (BP Location: Right arm, Patient Position: Chair, Cuff Size: Adult Large)   Pulse 93   Temp 101.7  F (38.7  " C) (Oral)   Resp 16   Ht 1.6 m (5' 3\")   Wt 116.1 kg (256 lb)   SpO2 96%   BMI 45.35 kg/m    Body mass index is 45.35 kg/m .  GENERAL: healthy, alert and no distress  EYES: Eyes grossly normal to inspection, PERRL and conjunctivae and sclerae normal  HENT: ear canals and TM's normal, nose and mouth without ulcers or lesions  RESP: lungs clear to auscultation - no rales, rhonchi or wheezes  CV: regular rate and rhythm, normal S1 S2, no S3 or S4, no murmur, click or rub, no peripheral edema and peripheral pulses strong  MS: no gross musculoskeletal defects noted, no edema  SKIN: no suspicious lesions or rashes  NEURO: Normal strength and tone, mentation intact and speech normal  PSYCH: mentation appears normal, affect normal/bright  LYMPH: no cervical, supraclavicular, axillary, or inguinal adenopathy    Diagnostic Test Results:  Results for orders placed or performed in visit on 03/05/19 (from the past 24 hour(s))   Influenza A/B antigen   Result Value Ref Range    Influenza A/B Agn Specimen Nasal     Influenza A Positive (A) NEG^Negative    Influenza B Negative NEG^Negative   Rapid strep screen   Result Value Ref Range    Specimen Description Throat     Rapid Strep A Screen       NEGATIVE: No Group A streptococcal antigen detected by immunoassay, await culture report.       ASSESSMENT/PLAN:       (J10.1) Influenza A  (primary encounter diagnosis)    Comment: Treat with tamiflu, rest, and fluids.    Plan: Influenza A/B antigen, Beta strep group A         culture, oseltamivir (TAMIFLU) 75 MG capsule            (R07.0) Throat pain    Comment: Rapid strep is negative. Likely from influenza.    Plan: Rapid strep screen, Beta strep group A culture              Patient Instructions     Patient Education     Influenza (Adult)    Influenza is also called the flu. It is a viral illness that affects the air passages of your lungs. It is different from the common cold. The flu can easily be passed from one to person to " another. It may be spread through the air by coughing and sneezing. Or it can be spread by touching the sick person and then touching your own eyes, nose, or mouth.  The flu starts 1 to 3 days after you are exposed to the flu virus. It may last for 1 to 2 weeks but many people feel tired or fatigued for many weeks afterward. You usually don t need to take antibiotics unless you have a complication. This might be an ear or sinus infection or pneumonia.  Symptoms of the flu may be mild or severe. They can include extreme tiredness (wanting to stay in bed all day), chills, fevers, muscle aches, soreness with eye movement, headache, and a dry, hacking cough.  Home care  Follow these guidelines when caring for yourself at home:    Avoid being around cigarette smoke, whether yours or other people s.    Acetaminophen or ibuprofen will help ease your fever, muscle aches, and headache. Don t give aspirin to anyone younger than 18 who has the flu. Aspirin can harm the liver.    Nausea and loss of appetite are common with the flu. Eat light meals. Drink 6 to 8 glasses of liquids every day. Good choices are water, sport drinks, soft drinks without caffeine, juices, tea, and soup. Extra fluids will also help loosen secretions in your nose and lungs.    Over-the-counter cold medicines will not make the flu go away faster. But the medicines may help with coughing, sore throat, and congestion in your nose and sinuses. Don t use a decongestant if you have high blood pressure.    Stay home until your fever has been gone for at least 24 hours without using medicine to reduce fever.  Follow-up care  Follow up with your healthcare provider, or as advised, if you are not getting better over the next week.  If you are age 65 or older, talk with your provider about getting a pneumococcal vaccine every 5 years. You should also get this vaccine if you have chronic asthma or COPD. All adults should get a flu vaccine every fall. Ask your  provider about this.  When to seek medical advice  Call your healthcare provider right away if any of these occur:    Cough with lots of colored mucus (sputum) or blood in your mucus    Chest pain, shortness of breath, wheezing, or trouble breathing    Severe headache, or face, neck, or ear pain    New rash with fever    Fever of 100.4 F (38 C) or higher, or as directed by your healthcare provider    Confusion, behavior change, or seizure    Severe weakness or dizziness    You get a new fever or cough after getting better for a few days  Date Last Reviewed: 1/1/2017 2000-2018 [employer]. [address] [place], [place] [id number]. All rights reserved. This information is not intended as a substitute for professional medical care. Always follow your healthcare professional's instructions.               Marco Antonio Ken PA-C  [facility]

## 2019-03-05 NOTE — LETTER
March 5, 2019      Darien Buitrago  [id number] [facility] [address]        To Whom It May Concern:    Darien Buitrago  was seen on 3/5/2019.  Please excuse him  until 3/11/19 due to illness.        Sincerely,        Marco Antonio Ken PA-C

## 2019-03-06 LAB
BACTERIA SPEC CULT: NORMAL
SPECIMEN SOURCE: NORMAL

## 2020-01-10 ENCOUNTER — OFFICE VISIT (OUTPATIENT)
Dept: FAMILY MEDICINE | Facility: CLINIC | Age: 35
End: 2020-01-10
Payer: COMMERCIAL

## 2020-01-10 VITALS
DIASTOLIC BLOOD PRESSURE: 84 MMHG | RESPIRATION RATE: 16 BRPM | TEMPERATURE: 98 F | BODY MASS INDEX: 48.37 KG/M2 | HEIGHT: 63 IN | WEIGHT: 273 LBS | HEART RATE: 67 BPM | SYSTOLIC BLOOD PRESSURE: 127 MMHG

## 2020-01-10 DIAGNOSIS — E66.01 MORBID OBESITY (H): ICD-10-CM

## 2020-01-10 DIAGNOSIS — R10.2 PELVIC PAIN IN MALE: Primary | ICD-10-CM

## 2020-01-10 PROBLEM — M79.641 PAIN OF RIGHT HAND: Status: RESOLVED | Noted: 2018-07-24 | Resolved: 2020-01-10

## 2020-01-10 PROCEDURE — 99214 OFFICE O/P EST MOD 30 MIN: CPT | Performed by: FAMILY MEDICINE

## 2020-01-10 ASSESSMENT — MIFFLIN-ST. JEOR: SCORE: 2068.45

## 2020-01-10 NOTE — PROGRESS NOTES
"Subjective     Darien Buitrago is a 34 year old male who presents to clinic today for the following health issues:    HPI     Right scrotal pain for 1 week, radiates into the abdomen. Describes as dull. No swelling of the scrotum.     No dysuria. No penile discharge.     No pain with intercourse.     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     Normal brown stools daily. No pain with stooling.       Patient Active Problem List   Diagnosis     CAYDEN (obstructive sleep apnea)     Abnormal pulmonary function test     Morbid obesity (H)     History reviewed. No pertinent surgical history.    Social History     Tobacco Use     Smoking status: Never Smoker     Smokeless tobacco: Never Used   Substance Use Topics     Alcohol use: Yes     Comment: very rarely     Family History   Problem Relation Age of Onset     Cerebrovascular Disease Paternal Grandfather          Reviewed and updated as needed this visit by Provider  Tobacco  Allergies  Meds  Problems  Med Hx  Surg Hx  Fam Hx         Review of Systems   ROS COMP: Constitutional, HEENT, cardiovascular, pulmonary, gi and gu systems are negative, except as otherwise noted.      Objective    /84 (BP Location: Right arm, Patient Position: Chair, Cuff Size: Adult Large)   Pulse 67   Temp 98  F (36.7  C) (Oral)   Resp 16   Ht 1.6 m (5' 3\")   Wt 123.8 kg (273 lb)   BMI 48.36 kg/m    Body mass index is 48.36 kg/m .  Physical Exam   GENERAL: morbidly obese, alert and no distress   (male): uncircumcized penis with no discharge or erythema, scrotal sac with no swelling, no ttp over the testicles or epididymis, no masses notes, inguinal exam suspicious for right sided hernia    Diagnostic Test Results:  Labs reviewed in Epic        Assessment & Plan     1. Pelvic pain in male -  Suspect right inguinal hernia based on exam, although his morbid obesity makes for a difficult exam. Suggested pelvic ultrasound to better assess. He will consider.   - US Testicular " and Scrotum; Future     2. Morbid obesity (H)    He has several health concerns today. Reviewed that a physical exam in the near future would be appropriate to address his concerns.       Return in about 1 month (around 2/10/2020) for if symptoms fail to improve or worsen.    25 minutes spent with patient face-to-face, > 50% in counseling and coordination of care regarding the issues addressed in the assessment and plan.     Sharmaine Marinelli MD  [facility]

## 2020-01-17 ENCOUNTER — TELEPHONE (OUTPATIENT)
Dept: FAMILY MEDICINE | Facility: CLINIC | Age: 35
End: 2020-01-17

## 2020-01-17 ENCOUNTER — HOSPITAL ENCOUNTER (OUTPATIENT)
Dept: ULTRASOUND IMAGING | Facility: CLINIC | Age: 35
Discharge: HOME | End: 2020-01-17
Attending: FAMILY MEDICINE | Admitting: FAMILY MEDICINE
Payer: COMMERCIAL

## 2020-01-17 DIAGNOSIS — R10.2 PELVIC PAIN IN MALE: ICD-10-CM

## 2020-01-17 PROCEDURE — 93976 VASCULAR STUDY: CPT

## 2020-01-17 NOTE — TELEPHONE ENCOUNTER
PUNEET for call back -  See below      Notes recorded by Chris Galeas MD on 1/17/2020 at 11:14 AM CST  Epididymal cyst is benign finding.  No problems or issues noted on US    Kimberley Bansal RN

## 2020-01-21 NOTE — TELEPHONE ENCOUNTER
Pt notified of below - advised to f/u if symptoms continue    Pt expressed understanding and acceptance of the plan.  Pt had no further questions at this time.  Advised can call back to clinic at any time with concerns.     Kimberley Bansal RN

## 2020-07-21 ENCOUNTER — HOSPITAL ENCOUNTER (OUTPATIENT)
Facility: CLINIC | Age: 35
Setting detail: OBSERVATION
Discharge: HOME | End: 2020-07-22
Attending: EMERGENCY MEDICINE | Admitting: HOSPITALIST
Payer: COMMERCIAL

## 2020-07-21 ENCOUNTER — ANCILLARY PROCEDURE (OUTPATIENT)
Dept: GENERAL RADIOLOGY | Facility: CLINIC | Age: 35
End: 2020-07-21
Attending: PHYSICIAN ASSISTANT
Payer: COMMERCIAL

## 2020-07-21 ENCOUNTER — OFFICE VISIT (OUTPATIENT)
Dept: URGENT CARE | Facility: URGENT CARE | Age: 35
End: 2020-07-21
Payer: COMMERCIAL

## 2020-07-21 VITALS
DIASTOLIC BLOOD PRESSURE: 80 MMHG | WEIGHT: 282.2 LBS | TEMPERATURE: 97.8 F | BODY MASS INDEX: 49.99 KG/M2 | SYSTOLIC BLOOD PRESSURE: 104 MMHG | RESPIRATION RATE: 16 BRPM | HEART RATE: 59 BPM | OXYGEN SATURATION: 98 %

## 2020-07-21 DIAGNOSIS — E78.5 DYSLIPIDEMIA (HIGH LDL; LOW HDL): ICD-10-CM

## 2020-07-21 DIAGNOSIS — R07.9 CHEST PAIN, UNSPECIFIED TYPE: ICD-10-CM

## 2020-07-21 DIAGNOSIS — R79.89 ELEVATED TROPONIN: ICD-10-CM

## 2020-07-21 DIAGNOSIS — E11.9 TYPE 2 DIABETES MELLITUS WITHOUT COMPLICATION, WITHOUT LONG-TERM CURRENT USE OF INSULIN (H): Primary | ICD-10-CM

## 2020-07-21 DIAGNOSIS — R07.9 CHEST PAIN, UNSPECIFIED TYPE: Primary | ICD-10-CM

## 2020-07-21 LAB
ALBUMIN SERPL-MCNC: 3.9 G/DL (ref 3.4–5)
ALP SERPL-CCNC: 46 U/L (ref 40–150)
ALT SERPL W P-5'-P-CCNC: 80 U/L (ref 0–70)
ANION GAP SERPL CALCULATED.3IONS-SCNC: 7 MMOL/L (ref 3–14)
AST SERPL W P-5'-P-CCNC: 48 U/L (ref 0–45)
BASOPHILS # BLD AUTO: 0.1 10E9/L (ref 0–0.2)
BASOPHILS NFR BLD AUTO: 0.6 %
BILIRUB SERPL-MCNC: 1.2 MG/DL (ref 0.2–1.3)
BUN SERPL-MCNC: 12 MG/DL (ref 7–30)
CALCIUM SERPL-MCNC: 8.7 MG/DL (ref 8.5–10.1)
CHLORIDE SERPL-SCNC: 108 MMOL/L (ref 94–109)
CO2 SERPL-SCNC: 22 MMOL/L (ref 20–32)
CREAT SERPL-MCNC: 0.88 MG/DL (ref 0.66–1.25)
D DIMER PPP FEU-MCNC: 0.4 UG/ML FEU (ref 0–0.5)
DIFFERENTIAL METHOD BLD: NORMAL
EOSINOPHIL # BLD AUTO: 0.3 10E9/L (ref 0–0.7)
EOSINOPHIL NFR BLD AUTO: 2.9 %
ERYTHROCYTE [DISTWIDTH] IN BLOOD BY AUTOMATED COUNT: 12.8 % (ref 10–15)
GFR SERPL CREATININE-BSD FRML MDRD: >90 ML/MIN/{1.73_M2}
GLUCOSE SERPL-MCNC: 124 MG/DL (ref 70–99)
HBA1C MFR BLD: 7.3 % (ref 0–5.6)
HCT VFR BLD AUTO: 45.8 % (ref 40–53)
HGB BLD-MCNC: 14.8 G/DL (ref 13.3–17.7)
IMM GRANULOCYTES # BLD: 0 10E9/L (ref 0–0.4)
IMM GRANULOCYTES NFR BLD: 0.3 %
LYMPHOCYTES # BLD AUTO: 2.6 10E9/L (ref 0.8–5.3)
LYMPHOCYTES NFR BLD AUTO: 30.4 %
MCH RBC QN AUTO: 31.4 PG (ref 26.5–33)
MCHC RBC AUTO-ENTMCNC: 32.3 G/DL (ref 31.5–36.5)
MCV RBC AUTO: 97 FL (ref 78–100)
MONOCYTES # BLD AUTO: 0.6 10E9/L (ref 0–1.3)
MONOCYTES NFR BLD AUTO: 6.9 %
NEUTROPHILS # BLD AUTO: 5.1 10E9/L (ref 1.6–8.3)
NEUTROPHILS NFR BLD AUTO: 58.9 %
NRBC # BLD AUTO: 0 10*3/UL
NRBC BLD AUTO-RTO: 0 /100
PLATELET # BLD AUTO: 185 10E9/L (ref 150–450)
POTASSIUM SERPL-SCNC: 3.7 MMOL/L (ref 3.4–5.3)
PROT SERPL-MCNC: 8.4 G/DL (ref 6.8–8.8)
RBC # BLD AUTO: 4.72 10E12/L (ref 4.4–5.9)
SODIUM SERPL-SCNC: 137 MMOL/L (ref 133–144)
T4 FREE SERPL-MCNC: 0.92 NG/DL (ref 0.76–1.46)
TROPONIN I SERPL-MCNC: 0.07 UG/L (ref 0–0.04)
TROPONIN I SERPL-MCNC: 0.07 UG/L (ref 0–0.04)
TROPONIN I SERPL-MCNC: 0.08 UG/L (ref 0–0.04)
TSH SERPL DL<=0.005 MIU/L-ACNC: 9.01 MU/L (ref 0.4–4)
WBC # BLD AUTO: 8.6 10E9/L (ref 4–11)

## 2020-07-21 PROCEDURE — G0378 HOSPITAL OBSERVATION PER HR: HCPCS

## 2020-07-21 PROCEDURE — 84484 ASSAY OF TROPONIN QUANT: CPT | Performed by: PHYSICIAN ASSISTANT

## 2020-07-21 PROCEDURE — 96366 THER/PROPH/DIAG IV INF ADDON: CPT

## 2020-07-21 PROCEDURE — 84439 ASSAY OF FREE THYROXINE: CPT | Performed by: EMERGENCY MEDICINE

## 2020-07-21 PROCEDURE — 84443 ASSAY THYROID STIM HORMONE: CPT | Performed by: EMERGENCY MEDICINE

## 2020-07-21 PROCEDURE — 85379 FIBRIN DEGRADATION QUANT: CPT | Performed by: EMERGENCY MEDICINE

## 2020-07-21 PROCEDURE — 93000 ELECTROCARDIOGRAM COMPLETE: CPT | Performed by: PHYSICIAN ASSISTANT

## 2020-07-21 PROCEDURE — 93005 ELECTROCARDIOGRAM TRACING: CPT

## 2020-07-21 PROCEDURE — 99285 EMERGENCY DEPT VISIT HI MDM: CPT | Mod: 25

## 2020-07-21 PROCEDURE — 99215 OFFICE O/P EST HI 40 MIN: CPT | Performed by: PHYSICIAN ASSISTANT

## 2020-07-21 PROCEDURE — C9803 HOPD COVID-19 SPEC COLLECT: HCPCS

## 2020-07-21 PROCEDURE — 83036 HEMOGLOBIN GLYCOSYLATED A1C: CPT | Performed by: EMERGENCY MEDICINE

## 2020-07-21 PROCEDURE — 71046 X-RAY EXAM CHEST 2 VIEWS: CPT

## 2020-07-21 PROCEDURE — 36415 COLL VENOUS BLD VENIPUNCTURE: CPT | Performed by: PHYSICIAN ASSISTANT

## 2020-07-21 PROCEDURE — U0003 INFECTIOUS AGENT DETECTION BY NUCLEIC ACID (DNA OR RNA); SEVERE ACUTE RESPIRATORY SYNDROME CORONAVIRUS 2 (SARS-COV-2) (CORONAVIRUS DISEASE [COVID-19]), AMPLIFIED PROBE TECHNIQUE, MAKING USE OF HIGH THROUGHPUT TECHNOLOGIES AS DESCRIBED BY CMS-2020-01-R: HCPCS | Performed by: EMERGENCY MEDICINE

## 2020-07-21 PROCEDURE — 99219 ZZC INITIAL OBSERVATION CARE,LEVL II: CPT | Performed by: PHYSICIAN ASSISTANT

## 2020-07-21 PROCEDURE — 85025 COMPLETE CBC W/AUTO DIFF WBC: CPT | Performed by: EMERGENCY MEDICINE

## 2020-07-21 PROCEDURE — 96365 THER/PROPH/DIAG IV INF INIT: CPT

## 2020-07-21 PROCEDURE — 84484 ASSAY OF TROPONIN QUANT: CPT | Performed by: EMERGENCY MEDICINE

## 2020-07-21 PROCEDURE — 80050 GENERAL HEALTH PANEL: CPT | Performed by: PHYSICIAN ASSISTANT

## 2020-07-21 PROCEDURE — 25000128 H RX IP 250 OP 636: Performed by: EMERGENCY MEDICINE

## 2020-07-21 PROCEDURE — 25000132 ZZH RX MED GY IP 250 OP 250 PS 637: Performed by: EMERGENCY MEDICINE

## 2020-07-21 RX ORDER — ASPIRIN 81 MG/1
324 TABLET, CHEWABLE ORAL ONCE
Status: COMPLETED | OUTPATIENT
Start: 2020-07-21 | End: 2020-07-21

## 2020-07-21 RX ORDER — LANOLIN ALCOHOL/MO/W.PET/CERES
3 CREAM (GRAM) TOPICAL
Status: DISCONTINUED | OUTPATIENT
Start: 2020-07-21 | End: 2020-07-22 | Stop reason: HOSPADM

## 2020-07-21 RX ORDER — ACETAMINOPHEN 325 MG/1
650 TABLET ORAL EVERY 4 HOURS PRN
Status: DISCONTINUED | OUTPATIENT
Start: 2020-07-21 | End: 2020-07-22 | Stop reason: HOSPADM

## 2020-07-21 RX ORDER — ALUMINA, MAGNESIA, AND SIMETHICONE 2400; 2400; 240 MG/30ML; MG/30ML; MG/30ML
30 SUSPENSION ORAL EVERY 4 HOURS PRN
Status: DISCONTINUED | OUTPATIENT
Start: 2020-07-21 | End: 2020-07-22 | Stop reason: HOSPADM

## 2020-07-21 RX ORDER — PROCHLORPERAZINE 25 MG
25 SUPPOSITORY, RECTAL RECTAL EVERY 12 HOURS PRN
Status: DISCONTINUED | OUTPATIENT
Start: 2020-07-21 | End: 2020-07-22 | Stop reason: HOSPADM

## 2020-07-21 RX ORDER — ONDANSETRON 4 MG/1
8 TABLET, ORALLY DISINTEGRATING ORAL EVERY 8 HOURS PRN
Status: DISCONTINUED | OUTPATIENT
Start: 2020-07-21 | End: 2020-07-22 | Stop reason: HOSPADM

## 2020-07-21 RX ORDER — MORPHINE SULFATE 2 MG/ML
2-4 INJECTION, SOLUTION INTRAMUSCULAR; INTRAVENOUS
Status: DISCONTINUED | OUTPATIENT
Start: 2020-07-21 | End: 2020-07-22 | Stop reason: HOSPADM

## 2020-07-21 RX ORDER — NITROGLYCERIN 0.4 MG/1
0.4 TABLET SUBLINGUAL EVERY 5 MIN PRN
Status: DISCONTINUED | OUTPATIENT
Start: 2020-07-21 | End: 2020-07-22 | Stop reason: HOSPADM

## 2020-07-21 RX ORDER — NALOXONE HYDROCHLORIDE 0.4 MG/ML
.1-.4 INJECTION, SOLUTION INTRAMUSCULAR; INTRAVENOUS; SUBCUTANEOUS
Status: DISCONTINUED | OUTPATIENT
Start: 2020-07-21 | End: 2020-07-22 | Stop reason: HOSPADM

## 2020-07-21 RX ORDER — AMOXICILLIN 250 MG
2 CAPSULE ORAL 2 TIMES DAILY PRN
Status: DISCONTINUED | OUTPATIENT
Start: 2020-07-21 | End: 2020-07-22 | Stop reason: HOSPADM

## 2020-07-21 RX ORDER — LIDOCAINE 40 MG/G
CREAM TOPICAL
Status: DISCONTINUED | OUTPATIENT
Start: 2020-07-21 | End: 2020-07-22 | Stop reason: HOSPADM

## 2020-07-21 RX ORDER — PROCHLORPERAZINE MALEATE 5 MG
10 TABLET ORAL EVERY 6 HOURS PRN
Status: DISCONTINUED | OUTPATIENT
Start: 2020-07-21 | End: 2020-07-22 | Stop reason: HOSPADM

## 2020-07-21 RX ORDER — AMOXICILLIN 250 MG
1 CAPSULE ORAL 2 TIMES DAILY PRN
Status: DISCONTINUED | OUTPATIENT
Start: 2020-07-21 | End: 2020-07-22 | Stop reason: HOSPADM

## 2020-07-21 RX ORDER — IBUPROFEN 600 MG/1
600 TABLET, FILM COATED ORAL EVERY 6 HOURS PRN
Status: DISCONTINUED | OUTPATIENT
Start: 2020-07-21 | End: 2020-07-22 | Stop reason: HOSPADM

## 2020-07-21 RX ORDER — HEPARIN SODIUM 10000 [USP'U]/100ML
12 INJECTION, SOLUTION INTRAVENOUS CONTINUOUS
Status: DISCONTINUED | OUTPATIENT
Start: 2020-07-21 | End: 2020-07-22

## 2020-07-21 RX ORDER — ONDANSETRON 2 MG/ML
8 INJECTION INTRAMUSCULAR; INTRAVENOUS EVERY 8 HOURS PRN
Status: DISCONTINUED | OUTPATIENT
Start: 2020-07-21 | End: 2020-07-22 | Stop reason: HOSPADM

## 2020-07-21 RX ORDER — ACETAMINOPHEN 650 MG/1
650 SUPPOSITORY RECTAL EVERY 4 HOURS PRN
Status: DISCONTINUED | OUTPATIENT
Start: 2020-07-21 | End: 2020-07-22 | Stop reason: HOSPADM

## 2020-07-21 RX ORDER — BISACODYL 10 MG
10 SUPPOSITORY, RECTAL RECTAL DAILY PRN
Status: DISCONTINUED | OUTPATIENT
Start: 2020-07-21 | End: 2020-07-22 | Stop reason: HOSPADM

## 2020-07-21 RX ADMIN — HEPARIN SODIUM 12 UNITS/KG/HR: 10000 INJECTION, SOLUTION INTRAVENOUS at 20:55

## 2020-07-21 RX ADMIN — ASPIRIN 81 MG 324 MG: 81 TABLET ORAL at 18:17

## 2020-07-21 RX ADMIN — Medication 5118 UNITS: at 20:57

## 2020-07-21 ASSESSMENT — ENCOUNTER SYMPTOMS
NECK PAIN: 0
DIAPHORESIS: 0
COUGH: 1
WHEEZING: 0
FATIGUE: 0
CHILLS: 0
DIZZINESS: 0
VOMITING: 0
PALPITATIONS: 0
DIARRHEA: 0
DIZZINESS: 0
CONFUSION: 0
VOMITING: 0
SHORTNESS OF BREATH: 0
NAUSEA: 0
DIAPHORESIS: 0
BRUISES/BLEEDS EASILY: 0
DIARRHEA: 0
SORE THROAT: 0
FEVER: 0
FEVER: 0
DIFFICULTY URINATING: 0
HEADACHES: 0
SHORTNESS OF BREATH: 0
MYALGIAS: 0
COUGH: 1
CHEST TIGHTNESS: 0
NAUSEA: 0

## 2020-07-21 ASSESSMENT — MIFFLIN-ST. JEOR: SCORE: 2104.14

## 2020-07-21 NOTE — ED PROVIDER NOTES
"  History     Chief Complaint:  Chest Pain      HPI   Darien Buitrago is a 35 year old male with a history of CAYDEN and morbid obesity who presents to the ED for evaluation of chest pain. The patient reports that he has been experiencing intermittent episodes of chest pain for the past 3 days. He states that he was first woken from sleep on Sunday by the onset of midsternal chest pain, which he describes as a \"sharp\" discomfort. This resolved within 2-3 seconds, although he has experienced multiple episodes a day since then. The patient notes that his pain improved yesterday, although after carrying his child around 1100 today he developed recurrent, midsternal pain. This again resolved, although he experienced two other episodes of left-sided chest pain over the course of the day, prompting him to visit the [facility] urgent care. Blood work there revealed an elevated troponin of 0.081, and thus he was referred to the ED for cardiac workup. He did not receive any aspirin prior to arrival. Here in the ED, the patient denies any pains and notes that his last episode of chest pain was 30 minutes ago. He denies any associated shortness of breath, dizziness, nausea, vomiting, diarrhea, diaphoresis, leg pain or swelling, or fevers. He also denies association with eating and notes that this feels different to past reflux. The patient denies any recent viral infections, although he notes that he has had a dry cough for the past month. Of note, the patient states that he did drive to [place] a couple of weeks ago, which was a 9-hr car trip.     Cardiac/PE/DVT Risk Factors:  The patient has no history of hypertension, hyperlipidemia, diabetes, or smoking. He reports no family history of cardiac issues. The patient denies any personal history of PE, DVT, or clotting disorder, although his great uncle did have two blood clots. The patient reports recent travel, but he denies surgery or other immobilizations.  He is not on hormone " medication.    Allergies:  NKDA     Medications:    The patient is currently on no regular medications.     Past Medical History:    Morbid obesity  CAYDEN    Past Surgical History:    The patient does not have any pertinent past surgical history.    Family History:    No past pertinent family history.     Social History:  Negative for tobacco use.  Negative for alcohol use.   Negative for drug use.   Marital Status:   [2]     Review of Systems   Constitutional: Negative for diaphoresis and fever.   Respiratory: Positive for cough. Negative for shortness of breath.    Cardiovascular: Positive for chest pain. Negative for leg swelling.   Gastrointestinal: Negative for diarrhea, nausea and vomiting.   Musculoskeletal: Negative for myalgias.   Neurological: Negative for dizziness.   All other systems reviewed and are negative.        Physical Exam     Patient Vitals for the past 24 hrs:   BP Temp Temp src Pulse Heart Rate Resp SpO2 Weight   07/21/20 2021 -- -- -- -- 69 17 99 % --   07/21/20 2020 120/64 -- -- 69 75 -- 97 % --   07/21/20 2001 -- -- -- -- 76 14 100 % --   07/21/20 2000 116/84 -- -- 70 -- -- -- --   07/21/20 1920 110/73 -- -- 70 64 -- 98 % --   07/21/20 1900 119/71 -- -- 64 62 -- 99 % --   07/21/20 1840 112/66 -- -- 71 72 14 98 % --   07/21/20 1820 123/81 -- -- 73 73 -- 100 % --   07/21/20 1759 (!) 140/100 98.6  F (37  C) Oral 82 82 20 98 % 127.9 kg (282 lb)        Physical Exam    Nursing note and vitals reviewed.    Constitutional: Pleasant and well groomed. Elevated BMI.          HENT:    Mouth/Throat: Oropharynx is without swelling or erythema. Oral mucosa moist.    Eyes: Conjunctivae are normal. No scleral icterus.    Neck: Neck supple.   Cardiovascular: Normal rate, regular rhythm and intact distal pulses.    Pulmonary/Chest: Effort normal and breath sounds normal. No chest wall tenderness. No overlying skin changes.   Abdominal: Soft.  No distension. There is no tenderness.   Musculoskeletal:   No edema, No calf tenderness  Neurological:Alert. Coordination normal.   Skin: Skin is warm and dry.   Psychiatric: Normal mood and affect.         Emergency Department Course   ECG:  Indication: Chest pain  Time: 1810  Vent. Rate 79 bpm. HI interval 148. QRS duration 94. QT/QTc 380/435. P-R-T axis 38 52 24.  Normal sinus rhythm. Normal ECG. Read time: 1842     Laboratory:  CBC: WBC: 8.6, HGB: 14.8, PLT: 185    1812 Troponin: 0.074 (H)    D dimer: 0.4    Interventions:  1817 Aspirin 324 mg PO  2055 Heparin 25,000 units, 12 units/kg/hr, IV infusion  2057 Heparin 5,118 units IV bolus    Emergency Department Course:  Past medical records, nursing notes, and vitals reviewed.     1805 I performed an exam of the patient as documented above.     EKG obtained in the ED, see results above.      IV inserted. Medicine administered as documented above. Blood drawn. This was sent to the lab for further testing, results above.    2017 I consulted with Dr. Lawson with  of [facility], regarding the patient's history and presentation here in the emergency department. They advised that I start a heparin drip and admit him to the hospital.    2022 I rechecked the patient and discussed the results of his workup thus far. We discussed the likely plan for admission, and he felt comfortable with this.     The patient was sent for an observation bed while in the emergency department, findings above.     2045  I consulted with Shwetha Diallo PA-C of the hospitalist services. They are in agreement to accept the patient for admission to Dr. Galvin.    Findings and plan explained to the Patient who consents to admission. Discussed the patient with Shwetha Diallo for Dr. Galvin, who will admit the patient to an observation bed for further monitoring, evaluation, and treatment.    Impression & Plan    Medical Decision Making:  Darien Buitrago is a 35 year old male who presents with chest pain as described above.  He was sent from clinic after having an  elevated troponin.  I also considered PE as the etiology of this as he had had a recent long car trip and therefore a d-dimer was also added.  Hemoglobin was normal arguing against anemia as a cause of the elevated troponin and his creatinine was normal as well.  D-dimer was negative and therefore the diagnosis of PE was not pursued further.  Troponin was stable and actually slightly decreased on repeat.  I consulted with cardiology who agreed with the plan for admission and recommended heparinization.  I updated the patient on findings, the plan, and the risks and benefits of administering heparin.  He was in agreement with this.  Patient will be admitted to the care of Dr. Galvin for ongoing evaluation monitoring and management.    Diagnosis:    ICD-10-CM    1. Chest pain, unspecified type  R07.9    2. Elevated troponin  R79.89        Disposition:  Admitted to Dr. Galvin    Scribe Disclosure:  I, Arleen Correa, am serving as a scribe on 7/21/2020 at 6:05 PM to personally document services performed by Dee Amezcua* based on my observations and the provider's statements to me.      Arleen Correa  7/21/2020   [facility] EMERGENCY DEPARTMENT       Dee Amezcua MD  07/22/20 0212

## 2020-07-21 NOTE — PROGRESS NOTES
SUBJECTIVE:   Darien Buitrago is a 35 year old male presenting with a chief complaint of   Chief Complaint   Patient presents with     Urgent Care     Chest Pain     Ongoing chest pain on and off since sunday       He is an established patient of [facility].    Chest pain    Symptom Onset: 2 day(s) ago. The pain woke him up from his sleep. 2 days ago. Today felt pain while picking up his child. Currently asymptomatic.  Timing of illness: intermittent episodes lasting 10  seconds.  Current and Associated symptoms: chest pain and chest pressure/discomfort.  Has had a dry cough for the past couple months.  Denies: dyspnea, palpitations, irregular heart beats, near-syncope, syncope, fatigue, orthopnea and paroxysmal nocturnal dyspnea. Denies HA, visual changes, n/v, SOB. No swelling LE.  Character: achey and sharp.  Severity moderate.  Location: left chest.  Radiation: none.  Associated Symptoms: none.  Exacerbated by: nothing.  Relieved by: nothing. No treatment tried prior to arrival.  Cardiac risk factors: obesity  Denies: known cardiac disease, prior MI, tobacco, diabetes mellitus, hypertension, hypercholesterolemia/hyperlipidemia, CHF, arrhythmia, COPD and positive family history. No history of PE.  He is not currently on any medications. No new supplements/medication. Denies any recent chest injury or trauma. No recent heavy lifting. Has had heartburn in the past before, this feels different.       Review of Systems   Constitutional: Negative for chills, diaphoresis, fatigue and fever.   HENT: Negative for sore throat.    Eyes: Negative for visual disturbance.   Respiratory: Positive for cough (dry cough ). Negative for chest tightness, shortness of breath and wheezing.    Cardiovascular: Positive for chest pain. Negative for palpitations and leg swelling.   Gastrointestinal: Negative for diarrhea, nausea and vomiting.   Genitourinary: Negative for difficulty urinating.   Musculoskeletal: Negative for neck pain.   Skin:  Negative for rash.   Allergic/Immunologic: Negative for immunocompromised state.   Neurological: Negative for dizziness and headaches.   Hematological: Does not bruise/bleed easily.   Psychiatric/Behavioral: Negative for confusion.       Past Medical History:   Diagnosis Date     Abnormal pulmonary function test 7/25/2018     Morbid obesity (H) 7/25/2018     CAYDEN (obstructive sleep apnea) 7/24/2018     Pain of right hand 7/24/2018     Family History   Problem Relation Age of Onset     Cerebrovascular Disease Paternal Grandfather      No current outpatient medications on file.     Social History     Tobacco Use     Smoking status: Never Smoker     Smokeless tobacco: Never Used   Substance Use Topics     Alcohol use: Yes     Comment: very rarely       OBJECTIVE  /80 (BP Location: Right arm, Patient Position: Sitting, Cuff Size: Adult Large)   Pulse 59   Temp 97.8  F (36.6  C) (Tympanic)   Resp 16   Wt 128 kg (282 lb 3.2 oz)   SpO2 98%   BMI 49.99 kg/m      Physical Exam  Vitals signs and nursing note reviewed.   Constitutional:       General: He is not in acute distress.     Appearance: He is well-developed. He is obese.   HENT:      Head: Normocephalic and atraumatic.      Right Ear: External ear normal.      Left Ear: External ear normal.      Mouth/Throat:      Mouth: Mucous membranes are moist.      Pharynx: Oropharynx is clear.   Eyes:      Conjunctiva/sclera: Conjunctivae normal.   Neck:      Musculoskeletal: Normal range of motion.   Cardiovascular:      Rate and Rhythm: Regular rhythm.      Heart sounds: Normal heart sounds.   Pulmonary:      Effort: Pulmonary effort is normal. No respiratory distress.      Breath sounds: Normal breath sounds. No wheezing, rhonchi or rales.   Musculoskeletal:         General: No tenderness.      Right lower leg: No edema.      Left lower leg: No edema.      Comments: No chest wall tenderness to palpation.   Skin:     General: Skin is warm and dry.   Neurological:       Mental Status: He is alert and oriented to person, place, and time.   Psychiatric:         Mood and Affect: Mood normal.         Labs:  Results for orders placed or performed in visit on 07/21/20 (from the past 24 hour(s))   Troponin I   Result Value Ref Range    Troponin I ES 0.081 (H) 0.000 - 0.045 ug/L   Comprehensive metabolic panel (BMP + Alb, Alk Phos, ALT, AST, Total. Bili, TP)   Result Value Ref Range    Sodium 137 133 - 144 mmol/L    Potassium 3.7 3.4 - 5.3 mmol/L    Chloride 108 94 - 109 mmol/L    Carbon Dioxide 22 20 - 32 mmol/L    Anion Gap 7 3 - 14 mmol/L    Glucose 124 (H) 70 - 99 mg/dL    Urea Nitrogen 12 7 - 30 mg/dL    Creatinine 0.88 0.66 - 1.25 mg/dL    GFR Estimate >90 >60 mL/min/[1.73_m2]    GFR Estimate If Black >90 >60 mL/min/[1.73_m2]    Calcium 8.7 8.5 - 10.1 mg/dL    Bilirubin Total 1.2 0.2 - 1.3 mg/dL    Albumin 3.9 3.4 - 5.0 g/dL    Protein Total 8.4 6.8 - 8.8 g/dL    Alkaline Phosphatase 46 40 - 150 U/L    ALT 80 (H) 0 - 70 U/L    AST 48 (H) 0 - 45 U/L       CHEST TWO VIEWS  7/21/2020 3:11 PM      HISTORY: 35-year-old patient with chest pain.                                                                       IMPRESSION: Since July 24, 2018, heart size seems upper limit of  normal. No pleural effusion, pneumothorax, or abnormal area of  consolidation.     LETICIA BLAKE MD    EKG: done today reveals normal sinus rhythm, No ST or T-wave abnormalities to suggest ischemia. Normal axis.    ASSESSMENT:      ICD-10-CM    1. Chest pain, unspecified type  R07.9 EKG 12-lead complete w/read - Clinics     XR Chest 2 Views     Troponin I     Comprehensive metabolic panel (BMP + Alb, Alk Phos, ALT, AST, Total. Bili, TP)          PLAN:    Chest pain, unspecified: Discussed with patient EKG done today reveals NSR. Chest Xray is negative for effusion, pneumothorax, or consolidation. CMP and troponin pending. Advised patient to keep monitoring symptoms. Discussed red flag symptoms and when to seek  emergent care. He agrees with the plan. He is discharged from urgent care in no acute distress.  Addendum: Patient's troponin resulted elevated at 0.081. Patient notified to go to the ED for further evaluation. Brooklyn Ramiro notified.     Followup:    In the ED    Patient Instructions     Patient Education     Uncertain Causes of Chest Pain    Chest pain can happen for a number of reasons. Sometimes the cause can't be determined. If your condition does not seem serious, and your pain does not appear to be coming from your heart, your healthcare provider may recommend watching it closely. Sometimes the signs of a serious problem take more time to appear. Many problems not related to your heart can cause chest pain. These include:    Musculoskeletal. Costochondritis is an inflammation of the tissues around the ribs that can occur from trauma or overuse injuries, or a strain of the muscles of the chest wall    Respiratory. Pneumonia, collapsed lung (pneumothorax), or inflammation of the lining of the chest and lungs (pleurisy)    Gastrointestinal. Esophageal reflux, heartburn, ulcers, or gallbladder disease    Anxiety and panic disorders    Nerve compression and inflammation    Rare miscellaneous problems such as aortic aneurysm (a swelling of the large artery coming out of the heart) or pulmonary embolism (a blood clot in the lungs)  Home care  After your visit, follow these recommendations:    Rest today and avoid strenuous activity.    Take any prescribed medicine as directed.    Be aware of any recurrent chest pain and notice any changes  Follow-up care  Follow up with your healthcare provider if you do not start to feel better within 24 hours, or as advised.  Call 911  Call 911 if any of these occur:    A change in the type of pain: if it feels different, becomes more severe, lasts longer, or begins to spread into your shoulder, arm, neck, jaw or back    Shortness of breath or increased pain with  breathing    Weakness, dizziness, or fainting    Rapid heart beat    Crushing sensation in your chest  When to seek medical advice  Call your healthcare provider right away if any of the following occur:    Cough with dark colored sputum (phlegm) or blood    Fever of 100.4 F (38 C) or higher, or as directed by your healthcare provider    Swelling, pain or redness in one leg  Date Last Reviewed: 5/1/2018 2000-2019 [employer]. [address] [place], [place] [id number]. All rights reserved. This information is not intended as a substitute for professional medical care. Always follow your healthcare professional's instructions.

## 2020-07-21 NOTE — LETTER
July 21, 2020      Darien Buitrago  [address] [facility] [address]        To Whom It May Concern:    Darien Buitrago  was seen on 7/21/2020 in urgent care.  Please excuse his absence from work today.        Sincerely,        Tiffanie Booker PA-C

## 2020-07-21 NOTE — ED TRIAGE NOTES
Pt arrives to the ED due to chest pain. Pt states that over the past 3 days he has been having intermittent chest discomfort, with last episode 30 min ago. Pt states the pain lasts less than 5 sec when it happens. Pt seen at clinic and had blood drawn. Elevated troponin. Pt denies dizziness, SOB or nausea.

## 2020-07-21 NOTE — LETTER
[facility] OBSERVATION DEPARTMENT  [address]  [facility] [address]  [phone]    2020    Re: Darien Buitrago  [address] [facility] [address]  [phone] (home)     : 1985      To Whom It May Concern:      Darien Buirtago was hospitalized from 2020 until 2020 due to medical illness.    Sincerely,        Shima Valero PA-C

## 2020-07-21 NOTE — PATIENT INSTRUCTIONS
Patient Education     Uncertain Causes of Chest Pain    Chest pain can happen for a number of reasons. Sometimes the cause can't be determined. If your condition does not seem serious, and your pain does not appear to be coming from your heart, your healthcare provider may recommend watching it closely. Sometimes the signs of a serious problem take more time to appear. Many problems not related to your heart can cause chest pain. These include:    Musculoskeletal. Costochondritis is an inflammation of the tissues around the ribs that can occur from trauma or overuse injuries, or a strain of the muscles of the chest wall    Respiratory. Pneumonia, collapsed lung (pneumothorax), or inflammation of the lining of the chest and lungs (pleurisy)    Gastrointestinal. Esophageal reflux, heartburn, ulcers, or gallbladder disease    Anxiety and panic disorders    Nerve compression and inflammation    Rare miscellaneous problems such as aortic aneurysm (a swelling of the large artery coming out of the heart) or pulmonary embolism (a blood clot in the lungs)  Home care  After your visit, follow these recommendations:    Rest today and avoid strenuous activity.    Take any prescribed medicine as directed.    Be aware of any recurrent chest pain and notice any changes  Follow-up care  Follow up with your healthcare provider if you do not start to feel better within 24 hours, or as advised.  Call 911  Call 911 if any of these occur:    A change in the type of pain: if it feels different, becomes more severe, lasts longer, or begins to spread into your shoulder, arm, neck, jaw or back    Shortness of breath or increased pain with breathing    Weakness, dizziness, or fainting    Rapid heart beat    Crushing sensation in your chest  When to seek medical advice  Call your healthcare provider right away if any of the following occur:    Cough with dark colored sputum (phlegm) or blood    Fever of 100.4 F (38 C) or higher, or as  directed by your healthcare provider    Swelling, pain or redness in one leg  Date Last Reviewed: 5/1/2018 2000-2019 [employer]. [address] [place] [address], [place] [address]. All rights reserved. This information is not intended as a substitute for professional medical care. Always follow your healthcare professional's instructions.

## 2020-07-22 ENCOUNTER — APPOINTMENT (OUTPATIENT)
Dept: CARDIOLOGY | Facility: CLINIC | Age: 35
End: 2020-07-22
Attending: INTERNAL MEDICINE
Payer: COMMERCIAL

## 2020-07-22 ENCOUNTER — APPOINTMENT (OUTPATIENT)
Dept: CARDIOLOGY | Facility: CLINIC | Age: 35
End: 2020-07-22
Attending: PHYSICIAN ASSISTANT
Payer: COMMERCIAL

## 2020-07-22 VITALS
SYSTOLIC BLOOD PRESSURE: 123 MMHG | HEART RATE: 61 BPM | OXYGEN SATURATION: 96 % | TEMPERATURE: 96.4 F | HEIGHT: 63 IN | WEIGHT: 282 LBS | BODY MASS INDEX: 49.96 KG/M2 | RESPIRATION RATE: 18 BRPM | DIASTOLIC BLOOD PRESSURE: 88 MMHG

## 2020-07-22 LAB
ANION GAP SERPL CALCULATED.3IONS-SCNC: 6 MMOL/L (ref 3–14)
BUN SERPL-MCNC: 15 MG/DL (ref 7–30)
CALCIUM SERPL-MCNC: 8.6 MG/DL (ref 8.5–10.1)
CHLORIDE SERPL-SCNC: 108 MMOL/L (ref 94–109)
CHOLEST SERPL-MCNC: 175 MG/DL
CO2 SERPL-SCNC: 24 MMOL/L (ref 20–32)
CREAT SERPL-MCNC: 0.93 MG/DL (ref 0.66–1.25)
GFR SERPL CREATININE-BSD FRML MDRD: >90 ML/MIN/{1.73_M2}
GLUCOSE SERPL-MCNC: 134 MG/DL (ref 70–99)
HDLC SERPL-MCNC: 37 MG/DL
INTERPRETATION ECG - MUSE: NORMAL
LDLC SERPL CALC-MCNC: 119 MG/DL
LMWH PPP CHRO-ACNC: 0.23 IU/ML
LMWH PPP CHRO-ACNC: <0.1 IU/ML
NONHDLC SERPL-MCNC: 138 MG/DL
POTASSIUM SERPL-SCNC: 3.8 MMOL/L (ref 3.4–5.3)
SODIUM SERPL-SCNC: 138 MMOL/L (ref 133–144)
TRIGL SERPL-MCNC: 93 MG/DL
TROPONIN I SERPL-MCNC: 0.07 UG/L (ref 0–0.04)

## 2020-07-22 PROCEDURE — 25000132 ZZH RX MED GY IP 250 OP 250 PS 637: Performed by: PHYSICIAN ASSISTANT

## 2020-07-22 PROCEDURE — 84484 ASSAY OF TROPONIN QUANT: CPT | Performed by: PHYSICIAN ASSISTANT

## 2020-07-22 PROCEDURE — 25500064 ZZH RX 255 OP 636: Performed by: HOSPITALIST

## 2020-07-22 PROCEDURE — 99217 ZZC OBSERVATION CARE DISCHARGE: CPT | Performed by: PHYSICIAN ASSISTANT

## 2020-07-22 PROCEDURE — 93350 STRESS TTE ONLY: CPT | Mod: 26 | Performed by: INTERNAL MEDICINE

## 2020-07-22 PROCEDURE — 80061 LIPID PANEL: CPT | Performed by: PHYSICIAN ASSISTANT

## 2020-07-22 PROCEDURE — 36415 COLL VENOUS BLD VENIPUNCTURE: CPT | Performed by: PHYSICIAN ASSISTANT

## 2020-07-22 PROCEDURE — 93325 DOPPLER ECHO COLOR FLOW MAPG: CPT | Mod: 26 | Performed by: INTERNAL MEDICINE

## 2020-07-22 PROCEDURE — 93306 TTE W/DOPPLER COMPLETE: CPT | Mod: 26 | Performed by: INTERNAL MEDICINE

## 2020-07-22 PROCEDURE — 40000264 ECHO STRESS ECHOCARDIOGRAM

## 2020-07-22 PROCEDURE — 93321 DOPPLER ECHO F-UP/LMTD STD: CPT | Mod: 26 | Performed by: INTERNAL MEDICINE

## 2020-07-22 PROCEDURE — 96366 THER/PROPH/DIAG IV INF ADDON: CPT

## 2020-07-22 PROCEDURE — 93306 TTE W/DOPPLER COMPLETE: CPT | Mod: 59

## 2020-07-22 PROCEDURE — 99204 OFFICE O/P NEW MOD 45 MIN: CPT | Mod: 25 | Performed by: INTERNAL MEDICINE

## 2020-07-22 PROCEDURE — G0378 HOSPITAL OBSERVATION PER HR: HCPCS

## 2020-07-22 PROCEDURE — 36415 COLL VENOUS BLD VENIPUNCTURE: CPT | Performed by: HOSPITALIST

## 2020-07-22 PROCEDURE — 93016 CV STRESS TEST SUPVJ ONLY: CPT | Performed by: INTERNAL MEDICINE

## 2020-07-22 PROCEDURE — 93018 CV STRESS TEST I&R ONLY: CPT | Performed by: INTERNAL MEDICINE

## 2020-07-22 PROCEDURE — 80048 BASIC METABOLIC PNL TOTAL CA: CPT | Performed by: PHYSICIAN ASSISTANT

## 2020-07-22 PROCEDURE — 85520 HEPARIN ASSAY: CPT | Performed by: PHYSICIAN ASSISTANT

## 2020-07-22 PROCEDURE — 85520 HEPARIN ASSAY: CPT | Mod: 91 | Performed by: HOSPITALIST

## 2020-07-22 RX ORDER — ATORVASTATIN CALCIUM 20 MG/1
20 TABLET, FILM COATED ORAL AT BEDTIME
Qty: 30 TABLET | Refills: 0 | Status: SHIPPED | OUTPATIENT
Start: 2020-07-22 | End: 2020-07-31

## 2020-07-22 RX ORDER — METFORMIN HCL 500 MG
1000 TABLET, EXTENDED RELEASE 24 HR ORAL 2 TIMES DAILY WITH MEALS
Qty: 120 TABLET | Refills: 0 | Status: SHIPPED | OUTPATIENT
Start: 2020-07-22 | End: 2020-07-31

## 2020-07-22 RX ORDER — METFORMIN HCL 500 MG
500 TABLET, EXTENDED RELEASE 24 HR ORAL
Status: DISCONTINUED | OUTPATIENT
Start: 2020-07-22 | End: 2020-07-22 | Stop reason: HOSPADM

## 2020-07-22 RX ADMIN — HUMAN ALBUMIN MICROSPHERES AND PERFLUTREN 3 ML: 10; .22 INJECTION, SOLUTION INTRAVENOUS at 11:50

## 2020-07-22 RX ADMIN — ASPIRIN 325 MG: 325 TABLET, COATED ORAL at 08:06

## 2020-07-22 NOTE — PROGRESS NOTES
[facility]    Hospitalist Progress Note      Assessment & Plan   Darien Buitrago is a 35 year old male who was admitted on 7/21/2020 with three days of chest pain after initially presenting to an outside clinic. He was started on heparin and admitted to observation for further work up.    #Chest pressure   No recurrence. Chest pressure was intermittent over three days, non exertional. Resolved independently.  His admit ECG was non ischemic. Chest xray was negative. Serial troponins were without significant peak and fall, telemetry unrevealing. No recurrence of chest pressure. Heparin discontinued.  Seen in consultation by Cardiology, recommended exercise stress evaluation. If this is normal he will be stable for discharge to home later today with risk factor management and close primary care follow up.       #Diabetes Mellitus  Fasting glucoses elevated, A1C returned in diabetic range at 7.3. No prior diabetic diagnosis. Started on metformin with up taper from 500 mg with supper to BID as tolerated. Discussed importance of lifestyle changes. At discharge will be supplied with diabetic testing supplies and sugar log which should accompany him to his primary care appointment follow up.       #Dyslipidemia  Cholesterol panel updated during hospitalization, . Recommend statin therapy, Lipitor with repeat LFT's 1-2 weeks time.     Expected discharge: later today.     Shima Valero PA-C  Text Page (7am - 5pm, M-F)    Interval History   No chest pain, dyspnea. Ambulating without difficulty. No abdominal pain, nausea, vomiting.   Afebrile.  Wife at bedside, updated with plan of care.     -Data reviewed today: I reviewed all new labs and imaging results over the last 24 hours.     Physical Exam   Temp: 95.6  F (35.3  C) Temp src: Oral BP: 115/69 Pulse: 59 Heart Rate: 58 Resp: 18 SpO2: 96 % O2 Device: None (Room air)    Vitals:    07/21/20 1759 07/21/20 2204   Weight: 127.9 kg (282 lb) 127.9 kg (282 lb)      Vital Signs with Ranges  Temp:  [95.6  F (35.3  C)-98.6  F (37  C)] 95.6  F (35.3  C)  Pulse:  [59-82] 59  Heart Rate:  [58-82] 58  Resp:  [11-27] 18  BP: ()/() 115/69  SpO2:  [96 %-100 %] 96 %  No intake/output data recorded.      Constitutional:Awake, alert, no apparent distress  Respiratory:  Normal work of breathing. Lungs clear to auscultation bilaterally, no crackles or wheezing.  Cardiovascular: Regular rate and rhythm, normal S1 and S2, and no murmur appreciated.   GI: Normal bowel sounds, soft, non-distended, non-tender.   Skin/Integument: No rashes, no cyanosis, no peripheral edema.  Neuro: Moving all extremities with normal strength. Coordination and sensation grossly intact. Speech clear. No focal deficits.   Psych: Appropriate affect.            Medications     HEParin 12 Units/kg/hr (07/22/20 0806)       aspirin  325 mg Oral Daily     sodium chloride (PF)  3 mL Intracatheter Q8H       Data   Recent Labs   Lab 07/22/20  0639 07/22/20  0230 07/21/20  2244 07/21/20  1812 07/21/20  1623   WBC  --   --   --  8.6  --    HGB  --   --   --  14.8  --    MCV  --   --   --  97  --    PLT  --   --   --  185  --      --   --   --  137   POTASSIUM 3.8  --   --   --  3.7   CHLORIDE 108  --   --   --  108   CO2 24  --   --   --  22   BUN 15  --   --   --  12   CR 0.93  --   --   --  0.88   ANIONGAP 6  --   --   --  7   AYANNA 8.6  --   --   --  8.7   *  --   --   --  124*   ALBUMIN  --   --   --   --  3.9   PROTTOTAL  --   --   --   --  8.4   BILITOTAL  --   --   --   --  1.2   ALKPHOS  --   --   --   --  46   ALT  --   --   --   --  80*   AST  --   --   --   --  48*   TROPI  --  0.075* 0.070* 0.074* 0.081*       Recent Results (from the past 24 hour(s))   XR Chest 2 Views    Narrative    CHEST TWO VIEWS  7/21/2020 3:11 PM     HISTORY: 35-year-old patient with chest pain.       Impression    IMPRESSION: Since July 24, 2018, heart size seems upper limit of  normal. No pleural effusion,  pneumothorax, or abnormal area of  consolidation.    LETICIA BLAKE MD

## 2020-07-22 NOTE — PROGRESS NOTES
PRIMARY DIAGNOSIS: CHEST PAIN  OUTPATIENT/OBSERVATION GOALS TO BE MET BEFORE DISCHARGE:  1. Ruled out acute coronary syndrome (negative or stable Troponin): No- trops peaked at 0.081  2. Pain Status: Pain free.  3. Appropriate provocative testing performed: N/A  - Stress Test Procedure: n/a  - Interpretation of cardiac rhythm per telemetry tech: SB/SR     4. Cleared by Consultants (if applicable):Yes- cards saw  5. Return to near baseline physical activity: Yes  Discharge Planner Nurse   Safe discharge environment identified: Yes  Barriers to discharge: Yes       Entered by: Brigette Silver 1200      Please review provider order for any additional goals.   Nurse to notify provider when observation goals have been met and patient is ready for discharge.     Denies chest pain and associated symptoms. On tele- SB/SR. Cards saw- plan for stress test. Up ind. On heparin drip. Wife at bedside.

## 2020-07-22 NOTE — CONSULTS
[facility]    Cardiology Consultation     Date of Admission:  7/21/2020    Primary Care Physician   Sharmaine Marinelli     Consult Date:  07/22/2020      REASON FOR CONSULTATION:  Chest pain, troponin elevation.      REFERRING PHYSICIAN:  Hospitalist Service.      IMPRESSION:   1.  Noncardiac chest pains.   2.  Nonspecific troponin elevation, likely related to a concentric left ventricular hypertrophy.   3.  Morbid obesity.  This is likely the cause of his concentric left ventricular hypertrophy.   4.  Newly diagnosed DM.  5.  Low HDL.     This gentleman's chest pain lasts for a few seconds and there are no features suggesting angina.  I am not sure what it is, but I feel reasonably certain that it is not related to the heart.  As for the troponin elevation, it is microscopic and flat and certainly not consistent with acute coronary syndrome.  I strongly suspect that this troponin elevation is related to the presence of concentric left ventricular hypertrophy due to his body size. 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     We discussed the importance of weight loss.  He understands.  I briefly mentioned gastric bypass and I think the patient is not keen on this at this time.  He is concerned about the possibility of having heart attacks on exertion.  I think for this purpose, a stress echocardiogram for further evaluation would be useful.  I personally reviewed his echocardiographic images and this should be sufficient for stress echocardiography.  If this is normal, I think he will be discharged.      HISTORY OF PRESENT ILLNESS:  A very pleasant 35-year-old gentleman from [facility] who has no previous cardiac history.  He does not smoke, drink or abuse drugs.      FH negative for premature atherosclerotic disease.  He weighs about 280 pounds and is 5 feet 3 inches.      For the past few days he has been experiencing occasional episodes of left-sided chest discomfort, which is  pinpoint in location and not associated with nausea, vomiting, diaphoresis or shortness of breath.  The pain would last for less than 5 seconds.  He does have sleep apnea.  When he was carrying his children, he would get some pain as well, but again only lasting for a few seconds.  His troponins have been 0.074, 0.070 and 0.075.        Past Medical History   I have reviewed this patient's medical history and updated it with pertinent information if needed.   Past Medical History:   Diagnosis Date     Abnormal pulmonary function test 7/25/2018     Morbid obesity (H) 7/25/2018     CAYDEN (obstructive sleep apnea) 7/24/2018     Pain of right hand 7/24/2018       Past Surgical History   I have reviewed this patient's surgical history and updated it with pertinent information if needed.  No past surgical history on file.    Prior to Admission Medications   None     Allergies   No Known Allergies    Social History   I have reviewed this patient's social history and updated it with pertinent information if needed. Darien Buitrago  reports that he has never smoked. He has never used smokeless tobacco. He reports current alcohol use. He reports that he does not use drugs.    Family History   I have reviewed this patient's family history and updated it with pertinent information if needed.   Family History   Problem Relation Age of Onset     Cerebrovascular Disease Paternal Grandfather        Review of Systems   The 10 point Review of Systems is negative other than noted in the HPI or here.     Physical Exam   Temp: 95.2  F (35.1  C) Temp src: Oral BP: 124/65 Pulse: 61 Heart Rate: 58 Resp: 18 SpO2: 99 % O2 Device: None (Room air)    Vital Signs with Ranges  Temp:  [95.2  F (35.1  C)-98.6  F (37  C)] 95.2  F (35.1  C)  Pulse:  [59-82] 61  Heart Rate:  [58-82] 58  Resp:  [11-27] 18  BP: ()/() 124/65  SpO2:  [96 %-100 %] 99 %  281 lbs 15.99 oz     GENERAL:  Pleasant gentleman in no acute distress.   VITAL SIGNS:  Blood  pressure 115/69, heart rate is in the 50s, normal sinus rhythm.   HEENT:  Examination is unremarkable.  Mucous membranes are normal.  He has no clubbing, no peripheral or central cyanosis.   NECK:  No raised JVP, no thyromegaly.   CARDIAC:  Cardiac apex is not palpable.  Heart sounds are normal.   CHEST:  Symmetrical expansion without the use of accessory muscles.  Breath sounds are normal.   ABDOMEN:  Obese, nontender, no hepatosplenomegaly.  The abdominal aorta is not palpable.   EXTREMITIES:  Foot pulses are preserved.  No significant peripheral edema.   CENTRAL NERVOUS SYSTEM:  Grossly intact.   PSYCHIATRIC:  Mood appears normal.      LABORATORY INVESTIGATIONS:  CBC within normal limits.  Hemoglobin A1c 7.3.  HDL 37, .  EKG shows sinus rhythm with nonspecific ST segment changes only.        Data   Results for orders placed or performed during the hospital encounter of 07/21/20 (from the past 24 hour(s))   EKG 12 lead   Result Value Ref Range    Interpretation ECG Click View Image link to view waveform and result    CBC with platelets differential   Result Value Ref Range    WBC 8.6 4.0 - 11.0 10e9/L    RBC Count 4.72 4.4 - 5.9 10e12/L    Hemoglobin 14.8 13.3 - 17.7 g/dL    Hematocrit 45.8 40.0 - 53.0 %    MCV 97 78 - 100 fl    MCH 31.4 26.5 - 33.0 pg    MCHC 32.3 31.5 - 36.5 g/dL    RDW 12.8 10.0 - 15.0 %    Platelet Count 185 150 - 450 10e9/L    Diff Method Automated Method     % Neutrophils 58.9 %    % Lymphocytes 30.4 %    % Monocytes 6.9 %    % Eosinophils 2.9 %    % Basophils 0.6 %    % Immature Granulocytes 0.3 %    Nucleated RBCs 0 0 /100    Absolute Neutrophil 5.1 1.6 - 8.3 10e9/L    Absolute Lymphocytes 2.6 0.8 - 5.3 10e9/L    Absolute Monocytes 0.6 0.0 - 1.3 10e9/L    Absolute Eosinophils 0.3 0.0 - 0.7 10e9/L    Absolute Basophils 0.1 0.0 - 0.2 10e9/L    Abs Immature Granulocytes 0.0 0 - 0.4 10e9/L    Absolute Nucleated RBC 0.0    Troponin I   Result Value Ref Range    Troponin I ES 0.074 (H)  0.000 - 0.045 ug/L   D dimer quantitative   Result Value Ref Range    D Dimer 0.4 0.0 - 0.50 ug/ml FEU   TSH with free T4 reflex   Result Value Ref Range    TSH 9.01 (H) 0.40 - 4.00 mU/L   T4 free   Result Value Ref Range    T4 Free 0.92 0.76 - 1.46 ng/dL   Hemoglobin A1c   Result Value Ref Range    Hemoglobin A1C 7.3 (H) 0 - 5.6 %   Troponin I - Now then in 4 hours x 2   Result Value Ref Range    Troponin I ES 0.070 (H) 0.000 - 0.045 ug/L   Heparin 10a Level   Result Value Ref Range    Heparin 10A Level 0.23 IU/mL   Troponin I - Now then in 4 hours x 2   Result Value Ref Range    Troponin I ES 0.075 (H) 0.000 - 0.045 ug/L   Lipid panel reflex to direct LDL   Result Value Ref Range    Cholesterol 175 <200 mg/dL    Triglycerides 93 <150 mg/dL    HDL Cholesterol 37 (L) >39 mg/dL    LDL Cholesterol Calculated 119 (H) <100 mg/dL    Non HDL Cholesterol 138 (H) <130 mg/dL   Basic metabolic panel   Result Value Ref Range    Sodium 138 133 - 144 mmol/L    Potassium 3.8 3.4 - 5.3 mmol/L    Chloride 108 94 - 109 mmol/L    Carbon Dioxide 24 20 - 32 mmol/L    Anion Gap 6 3 - 14 mmol/L    Glucose 134 (H) 70 - 99 mg/dL    Urea Nitrogen 15 7 - 30 mg/dL    Creatinine 0.93 0.66 - 1.25 mg/dL    GFR Estimate >90 >60 mL/min/[1.73_m2]    GFR Estimate If Black >90 >60 mL/min/[1.73_m2]    Calcium 8.6 8.5 - 10.1 mg/dL   Echocardiogram Complete    Narrative    [id number]  [id number]  [id number]  [id number]^MARILIA^BJORN^[facility]  Echocardiography Laboratory  [address] [place] [address]        Name: LONG RODRIGUEZ  MRN: [id number]  : 1985  Study Date: 2020 07:12 AM  Age: 35 yrs  Gender: Male  Patient Location: [facility]  Reason For Study: Chest Pain, Chest Pressure, Chest Tightness  Ordering Physician: BJORN CAPELLAN  Performed By: Jillian Dequan     BSA: 2.2 m2  Height: 63 in  Weight: 280 lb  _____________________________________________________________________________  __         Procedure  Complete Portable Echo Adult.  _____________________________________________________________________________  __        Interpretation Summary     The visual ejection fraction is estimated at 55-60%.  Regional wall motion abnormalities cannot be excluded due to limited  visualization.  Recommend limited study with contrast. The study was technically difficult.  _____________________________________________________________________________  __        Left Ventricle  The left ventricle is normal in size. There is moderate concentric left  ventricular hypertrophy. Diastolic Doppler findings (E/E' ratio and/or other  parameters) suggest left ventricular filling pressures are normal. The visual  ejection fraction is estimated at 55-60%. Regional wall motion abnormalities  cannot be excluded due to limited visualization. The basal inferior wall was  not well visualized. The anterolateral wall was barely diagnostic with the  distal portion possibly hypokinetic.     Right Ventricle  The right ventricle is normal in size and function.     Atria  Normal left atrial size. Right atrial size is normal. There is no color  Doppler evidence of an atrial shunt.        Mitral Valve  There is mild mitral annular calcification. There is trace mitral  regurgitation.     Tricuspid Valve  There is trace tricuspid regurgitation. Right ventricular systolic pressure  could not be approximated due to inadequate tricuspid regurgitation.     Aortic Valve  The aortic valve is trileaflet. There is mild trileaflet aortic sclerosis. No  aortic regurgitation is present. No hemodynamically significant valvular  aortic stenosis.     Pulmonic Valve  There is no pulmonic valvular regurgitation.     Vessels  The aortic root is normal size. Normal size ascending aorta.        Pericardium  There is no pericardial effusion.     Rhythm  Sinus rhythm was noted.  _____________________________________________________________________________  __      MMode/2D Measurements & Calculations  IVSd: 1.5 cm  LVIDd: 4.4 cm  LVIDs: 3.1 cm  LVPWd: 1.5 cm  FS: 28.9 %  LV mass(C)d: 268.1 grams  LV mass(C)dI: 120.2 grams/m2  Ao root diam: 3.1 cm  LA dimension: 4.1 cm  asc Aorta Diam: 3.1 cm  LA/Ao: 1.3  LVOT diam: 2.2 cm  LVOT area: 3.8 cm2  LA Volume (BP): 37.7 ml     LA Volume Index (BP): 16.9 ml/m2  RWT: 0.67        Doppler Measurements & Calculations  MV E max danika: 85.4 cm/sec  MV A max danika: 62.6 cm/sec  MV E/A: 1.4  MV dec time: 0.20 sec  LV V1 max P.7 mmHg  LV V1 max: 82.5 cm/sec  LV V1 VTI: 18.7 cm  SV(LVOT): 72.0 ml  SI(LVOT): 32.3 ml/m2  PA acc time: 0.17 sec  E/E' av.0  Lateral E/e': 7.3  Medial E/e': 8.8              _____________________________________________________________________________  __        Report approved by: Reynaldo Montenegro 2020 10:59 AM      Echo Stress Echocardiogram    Narrative    [id number]  [id number]  [id number]  [id number]^RAMAN^LETICIA^[facility]  Echocardiography Laboratory  [address] [place] [address]        Name: LONG RODRIGUEZ  MRN: [id number]  : 1985  Study Date: 2020 11:44 AM  Age: 35 yrs  Gender: Male  Patient Location: [facility]  Reason For Study: Chest Pain  Ordering Physician: LETICIA CAMARGO  Referring Physician: ANTONIA CAZARES  Performed By: Val Wiggins     BSA: 2.2 m2  Height: 63 in  Weight: 283 lb  HR: 69  BP: 120/80 mmHg  _____________________________________________________________________________  __        Procedure  Stress Echo Complete. Contrast Optison.  _____________________________________________________________________________  __        Interpretation Summary  This was a normal stress echocardiogram with no evidence of stress-induced  ischemia.  _____________________________________________________________________________  __     Stress  The patient exercised 7:16.  RPP 29,714.  Exercise was stopped due to fatigue.  The patient exhibited no chest  pain during exercise.  There was a normal BP response to exercise.  A low to moderate workload was achieved.  Target Heart Rate was achieved.  A low workload was achieved.  Normal left ventricular function and wall motion at rest and post-stress.  The visual ejection fraction is estimated at >70%.  Left ventricular cavity size decreases with exercise.  Global LV systolic function augments with exercise.     Baseline  Resting ECG is normal.  The patient is in normal sinus rhythm.  No arrhythmia noted.  Normal left ventricular function and wall motion at rest.  The visual ejection fraction is estimated at 55-60%.     Stress Results             Protocol:  Valdemar          Maximum Predicted HR:   184 bpm             Target HR: 156 bpm        % Maximum Predicted HR: 97 %                        Stage  DurationHeart Rate  BP    Comment                             (mm:ss)   (bpm)                    Stage 1   3:00      126   144/80                    Stage 2   3:00      162   166/80                    Stage 3   1:16      179   180/80                   RecoveryR  6:00      106   134/80FAC: Below               Stress Duration:   7:16 mm:ss *        Recovery Time: 6:00 mm:ss         Maximum Stress HR: 179 bpm *           METS:          8     _____________________________________________________________________________  __                             Report approved by: Reynaldo Mcclellan 2020 02:23 PM                    _____________________________________________________________________________  __                 LET[facility]IA CAMARGO MD, [facility]             D: 2020   T: 2020   MT: SELVIN      Name:     LONG RODRIGUEZ   MRN:      [id number]        Account:       [id number]   :      1985           Consult Date:  2020      Document: [id number]

## 2020-07-22 NOTE — H&P
[facility]  History and Physical  Hospitalist       Date of Admission:  7/21/2020    Assessment & Plan   Darien Buitrago is a 35 year old male with CAYDEN who presented to [facility] ED on 7/21/2020 with chest pain x3 days and was found to have an elevated trop of 0.081 >> 0.074.  Started on hep gtt at the request of Cardiology and brought in for Observation and work-up.     Chest pain with elevated trop  Trop is trending down.  EKG without ST or T wave changes.  Added on TSH/T4.  Trend trops.  FLP in AM.  Echo ordered to assess for WMA or pericarditis.  Continue supportive cares.  Tele.  Cards consult.  Defer stress test to Cards.     COVID status:  Asymptomatic swab pending  DVT Prophylaxis: Hep gtt  Code Status: Full Code     Disposition: Expected discharge hopefully home tomorrow    Shwetha MENDEZ    PRIMARY CARE PROVIDER: Sharmaine Marinelli    CHIEF COMPLAINT  Chest pain    History is obtained from the patient    HISTORY OF PRESENT ILLNESS  Darien Buitrago is a 35 year old male with CAYDEN who presented to [facility] ED on 7/21/2020 with chest pain x3 days.  Patient notes that on Sunday, he awoke from sleep with central chest pressure/pain that lasted a few seconds before disappearing.  He was able to go back to sleep.  The following day, he had the same pain intermittently throughout the day but it would quickly dissipate.  Today, around 11AM, he was carrying his child and doing chores when he developed recurrent central chest pain/pressure.  Later on, he the pain moved about 2 inches to the left.  No associated diaphoresis, nausea, or pain radiating to the neck or arm.  Patient was concerned because he was told at home point his heart seemed a bit large on chest x-ray.  He was also concerned because recently, his cousin's wife who was 14 weeks pregnant had chest pain and was told she had pneumonia.  She was sent home with antibiotics but felt weaker and weaker until one day she took a nap and didn't wake up.  Because  of this, he sought care  at [facility] where EKG was normal but troponin was elevated at 0.081.  He was referred in for evaluation.    In the ED, /100, HR 82, RR 20, SpO2 98% on RA, temp 98.6.  CMP notable for ALT 90, AST 48, and glucose 124.  CBC within normal limits.  DDimer 0.4.  Trop 0.081 >> 0.074.  EKG again NSR.  CXR negative.  Case discussed with Cardiology by ED provider who recommended starting a hep gtt and bringing the patient in for evaluation.     Patient denies chest pain currently.  He has had a dry cough for about a month but no viral illness.  Denies shortness of breath, headache, vision changes, abdominal pain, unusual body aches, leg swelling, or any other new or unusual symptoms.  He is worried about that being overweight might make him higher risk for a heart attack.  He also notes his CPAP machine broke so he bought a new one on [employer] and is using his old settings but isn't sure if they are the correct settings for his new machine.    PAST MEDICAL HISTORY  1. Sleep apnea, uses CPAP  2. Obesity     PAST SURGICAL HISTORY:  None.    PRIOR TO ADMISSION MEDICATIONS  None     ALLERGIES  No Known Allergies    SOCIAL HISTORY   with 4 kids.  Non smoker.  Drinks socially.  Works nights operating and fixing machines.      FAMILY HISTORY  I have reviewed this patient's family history and updated it with pertinent information if needed.   Family History   Problem Relation Age of Onset     Cerebrovascular Disease Paternal Grandfather    Both parents are healthy.    REVIEW OF SYSTEMS:  14 point comprehensive ROS undertaken with pertinent positives and negatives as above and otherwise unremarkable.     PHYSICAL EXAM  Temp: 98.4  F (36.9  C) Temp src: Oral BP: 130/74 Pulse: 66 Heart Rate: 71 Resp: 16 SpO2: 98 % O2 Device: None (Room air)    Vital Signs with Ranges  Temp:  [97.8  F (36.6  C)-98.6  F (37  C)] 98.4  F (36.9  C)  Pulse:  [59-82] 66  Heart Rate:  [62-82] 71  Resp:  [11-27] 16  BP:  (100-140)/() 130/74  SpO2:  [96 %-100 %] 98 %  281 lbs 15.99 oz    GENERAL:  Pleasant, cooperative, alert. Well developed, well nourished.  Worried but doesn't appear significantly anxious.    HEENT: Normocephalic, atraumatic.  Extra occular mm intact.  Sclera clear. PERRL.  Mucous membranes moist.     PULMONARY: Clear to auscultation bilaterally   CARDIAC: Regular rate and rhythm.  No appreciated murmur.  Normal S1/S2.  No S3/S4.  ABDOMEN: Soft, obese, nontender non distended.    MUSCULOSKELETAL:  Moving x 4 spontaneously with CMS intact x4.  Normal bulk and tone.  No LE edema.   NEURO: Alert and oriented x3.  CN II-XII grossly intact and symmetric.  No focal deficits.   SKIN:  Warm, pink, dry.    DATA  Data reviewed today:  I personally reviewed the EKG tracing showing NSR and the chest x-ray image(s) showing no acute process.    Recent Labs   Lab 07/21/20  1812 07/21/20  1623   WBC 8.6  --    HGB 14.8  --    MCV 97  --      --    NA  --  137   POTASSIUM  --  3.7   CHLORIDE  --  108   CO2  --  22   BUN  --  12   CR  --  0.88   ANIONGAP  --  7   AYANNA  --  8.7   GLC  --  124*   ALBUMIN  --  3.9   PROTTOTAL  --  8.4   BILITOTAL  --  1.2   ALKPHOS  --  46   ALT  --  80*   AST  --  48*   TROPI 0.074* 0.081*       Recent Results (from the past 24 hour(s))   XR Chest 2 Views    Narrative    CHEST TWO VIEWS  7/21/2020 3:11 PM     HISTORY: 35-year-old patient with chest pain.       Impression    IMPRESSION: Since July 24, 2018, heart size seems upper limit of  normal. No pleural effusion, pneumothorax, or abnormal area of  consolidation.    LETICIA BLAKE MD

## 2020-07-22 NOTE — PLAN OF CARE
PRIMARY DIAGNOSIS: CHEST PAIN  OUTPATIENT/OBSERVATION GOALS TO BE MET BEFORE DISCHARGE:  1. Ruled out acute coronary syndrome (negative or stable Troponin):  Yes  2. Pain Status: Pain free.  3. Appropriate provocative testing performed: N/A  - Stress Test Procedure: n/a  - Interpretation of cardiac rhythm per telemetry tech: SR    4. Cleared by Consultants (if applicable):No  5. Return to near baseline physical activity: Yes  Discharge Planner Nurse   Safe discharge environment identified: Yes  Barriers to discharge: Yes       Entered by: Jaiden Lorenz 07/22/2020 3:50 AM     Please review provider order for any additional goals.   Nurse to notify provider when observation goals have been met and patient is ready for discharge.

## 2020-07-22 NOTE — PLAN OF CARE
ROOM # 233    Living Situation (if not independent, order SW consult): ind w/wife  Facility name:N/A  : Carl wife or Ruel, sister    Activity level at baseline: Ind  Activity level on admit: Ind      Patient registered to observation; given Patient Bill of Rights; given the opportunity to ask questions about observation status and their plan of care.  Patient has been oriented to the observation room, bathroom and call light is in place.    Discussed discharge goals and expectations with patient/family.

## 2020-07-22 NOTE — ED NOTES
[facility]  ED Nurse Handoff Report    Darien Buitrago is a 35 year old male   ED Chief complaint: Chest Pain  . ED Diagnosis:   Final diagnoses:   Chest pain, unspecified type   Elevated troponin     Allergies: No Known Allergies    Code Status: Full Code  Activity level - Baseline/Home:  Independent. Activity Level - Current:   Independent. Lift room needed: No. Bariatric: No   Needed: No   Isolation: No. Infection: Not Applicable.     Vital Signs:   Vitals:    07/21/20 1820 07/21/20 1840 07/21/20 1900 07/21/20 1920   BP: 123/81 112/66 119/71 110/73   Pulse: 73 71 64 70   Resp:  14     Temp:       TempSrc:       SpO2: 100% 98% 99% 98%   Weight:           Cardiac Rhythm:  ,      Pain level: 0-10 Pain Scale: 1  Patient confused: No. Patient Falls Risk: No.   Elimination Status: Has voided   Patient Report - Initial Complaint: Chest Pain. Focused Assessment: Midsternal chest pain mostly resolved. Elevated trop level from UC   Tests Performed:   Labs Ordered and Resulted from Time of ED Arrival Up to the Time of Departure from the ED   TROPONIN I - Abnormal; Notable for the following components:       Result Value    Troponin I ES 0.074 (*)     All other components within normal limits   CBC WITH PLATELETS DIFFERENTIAL   D DIMER QUANTITATIVE   PERIPHERAL IV CATHETER   PLATELETS MONITORED PER HEPARIN TREATMENT PROTOCOL (FOR MEANINGFUL USE   PULSE OXIMETRY NURSING   CARDIAC CONTINUOUS MONITORING   PATIENT CARE ORDER   MEASURE WEIGHT   NOTIFY PHYSICIAN   NOTIFY PHYSICIAN     Treatments provided: See MAR  Family Comments: Wife at bedside  OBS brochure/video discussed/provided to patient:  Yes  ED Medications:   Medications   heparin 25,000 units in 0.45% NaCl 250 mL ANTICOAGULANT  infusion (has no administration in time range)   heparin ANTICOAGULANT  Loading Dose bolus dose from infusion pump 5,118 Units (has no administration in time range)   aspirin (ASA) chewable tablet 324 mg (324 mg Oral Given  7/21/20 1817)     Drips infusing:  No  For the majority of the shift, the patient's behavior Green. Interventions performed were Monitor.    Sepsis treatment initiated: No       ED Nurse Name/Phone Number: Barry Cadena RN,   8:37 PM    .RECEIVING UNIT ED HANDOFF REVIEW    Above ED Nurse Handoff Report was reviewed: Yes  Reviewed by: Jaiden Lorenz RN on July 21, 2020 at 9:26 PM

## 2020-07-22 NOTE — PHARMACY-ADMISSION MEDICATION HISTORY
Admission medication history interview status for this patient is complete. See [facility] admission navigator for allergy information, prior to admission medications and immunization status.     Medication history interview source(s):Patient  Medication history resources (including written lists, pill bottles, clinic record):None  Primary pharmacy:none    Changes made to PTA medication list:  Added: none  Deleted: none  Changed: none    Actions taken by pharmacist (provider contacted, etc):None     Additional medication history information:pt denies all rx and otc medication use.     Medication reconciliation/reorder completed by provider prior to medication history? No    For patients on insulin therapy: No (Yes/No)         Prior to Admission medications    Not on File     pt denies all rx and otc medication use.

## 2020-07-22 NOTE — PLAN OF CARE
Patient's After Visit Summary was reviewed with patient and/or spouse.   Patient verbalized understanding of After Visit Summary, recommended follow up and was given an opportunity to ask questions.   Discharge medications sent home with patient/family: YES --metformin and atorvastatin. Blood glucometer.   Discharged with spouse      OBSERVATION patient END time: 5:07 PM

## 2020-07-22 NOTE — PLAN OF CARE
PRIMARY DIAGNOSIS: CHEST PAIN  OUTPATIENT/OBSERVATION GOALS TO BE MET BEFORE DISCHARGE:  1. Ruled out acute coronary syndrome (negative or stable Troponin):  No  2. Pain Status: Pain free.  3. Appropriate provocative testing performed: No  - Stress Test Procedure: N/A  - Interpretation of cardiac rhythm per telemetry tech: SR    4. Cleared by Consultants (if applicable):No  5. Return to near baseline physical activity: Yes  Discharge Planner Nurse   Safe discharge environment identified: Yes  Barriers to discharge: Yes       Entered by: Jaiden Lorenz 07/21/2020 10:45 PM     Please review provider order for any additional goals.   Nurse to notify provider when observation goals have been met and patient is ready for discharge.

## 2020-07-22 NOTE — DISCHARGE SUMMARY
[facility]    Discharge Summary  Hospitalist    Date of Admission:  7/21/2020  Date of Discharge:  7/22/2020  5:08 PM  Discharging Provider: Shima Valero PA-C  Date of Service (when I saw the patient): 07/22/20    Discharge Diagnoses   Chest Pain  Detectable troponin   Impaired Glucose Tolerance  Diabetes Mellitus   Dyslipidemia      History of Present Illness   Darien Buitrago is a 35 year old male who was admitted on 7/21/2020 for cardiac work up of three days of chest pain, after initially presenting to an outside clinic. He was started on heparin and admitted to observation for further work up.   Please see admitting H & P for full details of the encounter.     Hospital Course   Darien Buitrago was admitted on 7/21/2020.  The following problems were addressed during his hospitalization:    #Chest pressure   Low suspicion of ACS without angina, ischemic EKG changes, and mildly elevated troponin. Chest xray was negative. Serial troponins were without significant peak and fall, telemetry unrevealing. Had no recurrence of chest pressure. TTE updated 7/22, technically difficult study due to body habitus, showed preserved ejection fraction. Cardiology was consulted, heparin was discontinued. Exercise stress evaluation 7/22 was negative for inducible ischemia. Etiology of his chest pressure unclear, however stable medically for ongoing follow up with primary care.     #Nonspecific troponin elevation  Serial troponins stayed flat, detectable ~ 0.07. Thought to be related to a concentric LVH, potential obesity related cardiomyopathy without evidence of CHF. Echo stress test normal.         #Diabetes Mellitus  Fasting glucoses elevated, A1C returned in diabetic range at 7.3. No prior diagnosis of prediabetes or DM. Started on metformin with up taper from 500 mg with supper to BID as tolerated. Discussed importance of lifestyle changes. At discharge supplied with diabetic testing supplies, diabetic teaching, and  blood sugar log which should accompany him to his primary care appointment follow up.         #Dyslipidemia  Cholesterol panel updated during hospitalization,  with low HDL. Recommend statin therapy and exercise. Started on low dose Lipitor with repeat LFT's 1-2 weeks time.        Pending Results   These results will be followed up by Hospitalist.   Unresulted Labs Ordered in the Past 30 Days of this Admission     Date and Time Order Name Status Description    7/21/2020 2125 Asymptomatic COVID-19 Virus (Coronavirus) by PCR In process           Code Status   Full Code       Primary Care Physician   Sharmaine Marinelli      Discharge Disposition   Discharged to home  Condition at discharge: Stable    Consultations This Hospital Stay   PHARMACY TO DOSE HEPARIN  CARDIOLOGY IP CONSULT    Time Spent on this Encounter   I, Shima Valero PA-C, personally saw the patient today and spent greater than 30 minutes discharging this patient.    Discharge Orders      AMBULATORY ADULT DIABETES EDUCATOR REFERRAL      Reason for your hospital stay    You were admitted to the observation unit for chest pain. Your heart was monitored overnight with no abnormal findings. You had a stress echocardiogram that was negative for heart disease so we do not believe your chest pain was related to your heart. Other possibilities include reflux, stress, and musculoskeletal strain. We recommend you follow up with your primary doctor if you continue to have pain.     Activity    Your activity upon discharge: activity as tolerated     Monitor and record    blood glucose twice daily     Discharge Instructions     Follow-up and recommended labs and tests     Follow up with primary care provider, Sharmaine Marinelli, within 7 days to evaluate medication change and for hospital follow- up.  The following labs/tests are recommended: CMP. TSH with free T4 repeated in 8 weeks.     Diet    Follow this diet upon discharge: Diabetic diet, low  carbohydrates     Discharge Medications   Current Discharge Medication List      START taking these medications    Details   atorvastatin (LIPITOR) 20 MG tablet Take 1 tablet (20 mg) by mouth At Bedtime  Qty: 30 tablet, Refills: 0    Associated Diagnoses: Dyslipidemia (high LDL; low HDL)      blood glucose (NO BRAND SPECIFIED) lancets standard To use to test glucose level in the blood  Use to test blood sugar daily as directed. To accompany glucose monitor brands per insurance coverage.  Qty: 100 each, Refills: 0    Associated Diagnoses: Type 2 diabetes mellitus without complication, without long-term current use of insulin (H)      blood glucose (NO BRAND SPECIFIED) test strip To use to test glucose level in the blood  Use to test blood sugar daily as directed. To accompany glucose monitor brands per insurance coverage.  Qty: 100 each, Refills: 0    Associated Diagnoses: Type 2 diabetes mellitus without complication, without long-term current use of insulin (H)      blood glucose calibration (NO BRAND SPECIFIED) solution Used to calibrate the blood glucose monitor as needed and as directed.  To accompany  blood glucose brands per insurance coverage  Qty: 1 Bottle, Refills: 0    Associated Diagnoses: Type 2 diabetes mellitus without complication, without long-term current use of insulin (H)      blood glucose monitoring (NO BRAND SPECIFIED) meter device kit Use as directed   Per insurance coverage  Qty: 1 kit, Refills: 0    Associated Diagnoses: Type 2 diabetes mellitus without complication, without long-term current use of insulin (H)      metFORMIN (GLUCOPHAGE-XR) 500 MG 24 hr tablet Take 2 tablets (1,000 mg) by mouth 2 times daily (with meals) For 7/22 - 7/25 take once daily with supper. Around 7/26 Increase to 500 mg BID  Qty: 120 tablet, Refills: 0    Associated Diagnoses: Type 2 diabetes mellitus without complication, without long-term current use of insulin (H)           Allergies   No Known Allergies  Data    Most Recent 3 CBC's:  Recent Labs   Lab Test 20  1812 18  1155   WBC 8.6 8.6   HGB 14.8 15.1   MCV 97 96    154      Most Recent 3 BMP's:  Recent Labs   Lab Test 20  0639 20  1623 18  1155    137 138   POTASSIUM 3.8 3.7 3.6   CHLORIDE 108 108 106   CO2 24 22 24   BUN 15 12 17   CR 0.93 0.88 0.86   ANIONGAP 6 7 8   AYANNA 8.6 8.7 8.1*   * 124* 85     Most Recent 2 LFT's:  Recent Labs   Lab Test 20  1623 18  1155   AST 48* 21   ALT 80* 38   ALKPHOS 46 44   BILITOTAL 1.2 1.1     Most Recent INR's and Anticoagulation Dosing History:  Anticoagulation Dose History     There is no flowsheet data to display.        Most Recent 3 Troponin's:  Recent Labs   Lab Test 20  0230 20  2244 20  1812   TROPI 0.075* 0.070* 0.074*     Most Recent Cholesterol Panel:  Recent Labs   Lab Test 20  0639   CHOL 175   *   HDL 37*   TRIG 93     Most Recent 6 Bacteria Isolates From Any Culture (See EPIC Reports for Culture Details):  Recent Labs   Lab Test 19  1026   CULT No beta hemolytic Streptococcus Group A isolated     Most Recent TSH, T4 and A1c Labs:  Recent Labs   Lab Test 20  1812   TSH 9.01*   T4 0.92   A1C 7.3*     Results for orders placed or performed during the hospital encounter of 20   Echocardiogram Complete    Narrative    [id number]  [id number]  [id number]  [id number]^MARILIA^BJORN^[facility]  Echocardiography Laboratory  [address] [place] [address]        Name: LONG RODRIGUEZ  MRN: [id number]  : 1985  Study Date: 2020 07:12 AM  Age: 35 yrs  Gender: Male  Patient Location: [facility]  Reason For Study: Chest Pain, Chest Pressure, Chest Tightness  Ordering Physician: BJORN CAPELLAN  Performed By: Jillian Baires     BSA: 2.2 m2  Height: 63 in  Weight: 280 lb  _____________________________________________________________________________  __        Procedure  Complete Portable  Echo Adult.  _____________________________________________________________________________  __        Interpretation Summary     The visual ejection fraction is estimated at 55-60%.  Regional wall motion abnormalities cannot be excluded due to limited  visualization.  Recommend limited study with contrast. The study was technically difficult.  _____________________________________________________________________________  __        Left Ventricle  The left ventricle is normal in size. There is moderate concentric left  ventricular hypertrophy. Diastolic Doppler findings (E/E' ratio and/or other  parameters) suggest left ventricular filling pressures are normal. The visual  ejection fraction is estimated at 55-60%. Regional wall motion abnormalities  cannot be excluded due to limited visualization. The basal inferior wall was  not well visualized. The anterolateral wall was barely diagnostic with the  distal portion possibly hypokinetic.     Right Ventricle  The right ventricle is normal in size and function.     Atria  Normal left atrial size. Right atrial size is normal. There is no color  Doppler evidence of an atrial shunt.        Mitral Valve  There is mild mitral annular calcification. There is trace mitral  regurgitation.     Tricuspid Valve  There is trace tricuspid regurgitation. Right ventricular systolic pressure  could not be approximated due to inadequate tricuspid regurgitation.     Aortic Valve  The aortic valve is trileaflet. There is mild trileaflet aortic sclerosis. No  aortic regurgitation is present. No hemodynamically significant valvular  aortic stenosis.     Pulmonic Valve  There is no pulmonic valvular regurgitation.     Vessels  The aortic root is normal size. Normal size ascending aorta.        Pericardium  There is no pericardial effusion.     Rhythm  Sinus rhythm was noted.  _____________________________________________________________________________  __     MMode/2D Measurements &  Calculations  IVSd: 1.5 cm  LVIDd: 4.4 cm  LVIDs: 3.1 cm  LVPWd: 1.5 cm  FS: 28.9 %  LV mass(C)d: 268.1 grams  LV mass(C)dI: 120.2 grams/m2  Ao root diam: 3.1 cm  LA dimension: 4.1 cm  asc Aorta Diam: 3.1 cm  LA/Ao: 1.3  LVOT diam: 2.2 cm  LVOT area: 3.8 cm2  LA Volume (BP): 37.7 ml     LA Volume Index (BP): 16.9 ml/m2  RWT: 0.67        Doppler Measurements & Calculations  MV E max danika: 85.4 cm/sec  MV A max danika: 62.6 cm/sec  MV E/A: 1.4  MV dec time: 0.20 sec  LV V1 max P.7 mmHg  LV V1 max: 82.5 cm/sec  LV V1 VTI: 18.7 cm  SV(LVOT): 72.0 ml  SI(LVOT): 32.3 ml/m2  PA acc time: 0.17 sec  E/E' av.0  Lateral E/e': 7.3  Medial E/e': 8.8              _____________________________________________________________________________  __        Report approved by: Reynaldo Montenegro 2020 10:59 AM      Echo Stress Echocardiogram    Narrative    [id number]  [id number]  [id number]  [id number]^RAMAN^LETICIA^[facility]  Echocardiography Laboratory  [address] [place] [address]        Name: LONG RODRIGUEZ  MRN: [id number]  : 1985  Study Date: 2020 11:44 AM  Age: 35 yrs  Gender: Male  Patient Location: [facility]  Reason For Study: Chest Pain  Ordering Physician: LETICIA CAMARGO  Referring Physician: ANTONIA CAZARES  Performed By: Val Wiggins     BSA: 2.2 m2  Height: 63 in  Weight: 283 lb  HR: 69  BP: 120/80 mmHg  _____________________________________________________________________________  __        Procedure  Stress Echo Complete. Contrast Optison.  _____________________________________________________________________________  __        Interpretation Summary  This was a normal stress echocardiogram with no evidence of stress-induced  ischemia.  _____________________________________________________________________________  __     Stress  The patient exercised 7:16.  RPP 29,044.  Exercise was stopped due to fatigue.  The patient exhibited no chest pain during exercise.  There  was a normal BP response to exercise.  A low to moderate workload was achieved.  Target Heart Rate was achieved.  A low workload was achieved.  Normal left ventricular function and wall motion at rest and post-stress.  The visual ejection fraction is estimated at >70%.  Left ventricular cavity size decreases with exercise.  Global LV systolic function augments with exercise.     Baseline  Resting ECG is normal.  The patient is in normal sinus rhythm.  No arrhythmia noted.  Normal left ventricular function and wall motion at rest.  The visual ejection fraction is estimated at 55-60%.     Stress Results             Protocol:  Valdemar          Maximum Predicted HR:   184 bpm             Target HR: 156 bpm        % Maximum Predicted HR: 97 %                        Stage  DurationHeart Rate  BP    Comment                             (mm:ss)   (bpm)                    Stage 1   3:00      126   144/80                    Stage 2   3:00      162   166/80                    Stage 3   1:16      179   180/80                   RecoveryR  6:00      106   134/80FAC: Below               Stress Duration:   7:16 mm:ss *        Recovery Time: 6:00 mm:ss         Maximum Stress HR: 179 bpm *           METS:          8     _____________________________________________________________________________  __                             Report approved by: Reynaldo Mcclellan 07/22/2020 02:23 PM                    _____________________________________________________________________________  __            Shima [facility]

## 2020-07-22 NOTE — PROGRESS NOTES
PRIMARY DIAGNOSIS: CHEST PAIN  OUTPATIENT/OBSERVATION GOALS TO BE MET BEFORE DISCHARGE:  1. Ruled out acute coronary syndrome (negative or stable Troponin): No- trops peaked at 0.081  2. Pain Status: Pain free.  3. Appropriate provocative testing performed: N/A  - Stress Test Procedure: n/a  - Interpretation of cardiac rhythm per telemetry tech: SB/SR     4. Cleared by Consultants (if applicable):No- cards to see  5. Return to near baseline physical activity: Yes  Discharge Planner Nurse   Safe discharge environment identified: Yes  Barriers to discharge: Yes       Entered by: Brigette Silver 0900      Please review provider order for any additional goals.   Nurse to notify provider when observation goals have been met and patient is ready for discharge.    Denies chest pain and associated symptoms. On tele- SB/SR. NPO for cards consult. Up ind. On heparin drip.

## 2020-07-23 ENCOUNTER — TELEPHONE (OUTPATIENT)
Dept: FAMILY MEDICINE | Facility: CLINIC | Age: 35
End: 2020-07-23

## 2020-07-23 NOTE — TELEPHONE ENCOUNTER
[facility]ER on 7/22/20 for Chest Pain, Unspecified Type, Type 2 Diabetes Mellitus Without Complication, Without Long-Term Current Use Of Insulin  Patient is scheduled with Dr. Brennan on 7/30  Angelika Moe

## 2020-07-23 NOTE — TELEPHONE ENCOUNTER
"Hospital/TCU/ED for chronic condition Discharge Protocol    \"Hi, my name is Renu Watkins RN, a registered nurse, and I am calling from [facility].  I am calling to follow up and see how things are going for you after your recent emergency visit/hospital/TCU stay.\"    Tell me how you are doing now that you are home?\" doing fine      Discharge Instructions    \"Let's review your discharge instructions.  What is/are the follow-up recommendations?  Pt. Response: follow up with provider    \"Has an appointment with your primary care provider been scheduled?\"   Yes. (confirm)    \"When you see the provider, I would recommend that you bring your medications with you.\"    Medications    \"Tell me what changed about your medicines when you discharged?\"    Changes to chronic meds?    0-1    \"What questions do you have about your medications?\"    None     New diagnoses of heart failure, COPD, diabetes, or MI?    No              Post Discharge Medication Reconciliation Status: discharge medications reconciled, continue medications without change.    Was MTM referral placed (*Make sure to put transitions as reason for referral)?   No    Call Summary    \"What questions or concerns do you have about your recent visit and your follow-up care?\"     none    \"If you have questions or things don't continue to improve, we encourage you contact us through the main clinic number (give number).  Even if the clinic is not open, triage nurses are available 24/7 to help you.     We would like you to know that our clinic has extended hours (provide information).  We also have urgent care (provide details on closest location and hours/contact info)\"      \"Thank you for your time and take care!\"         Renu Watkins RN, BSN      "

## 2020-07-23 NOTE — TELEPHONE ENCOUNTER
ED / Discharge Outreach Protocol    Patient Contact    Attempt # 1    Was call answered?  No.  Left message on voicemail with information to call me back.    Renu Watkins RN, BSN

## 2020-07-24 LAB
SARS-COV-2 RNA SPEC QL NAA+PROBE: NOT DETECTED
SPECIMEN SOURCE: NORMAL

## 2020-07-28 NOTE — PROGRESS NOTES
"Pre-Visit Planning     Future Appointments   Date Time Provider Department Center   7/31/2020  3:30 PM Nomi Hart DO [facility]     Arrival Time for this Appointment:  3:10 PM   Appointment Notes for this encounter:   hospital f/u    Questionnaires Reviewed/Assigned  Additional questionnaires assigned        Patient preferred phone number: [phone]      Spoke to patient via phone. Patient does not have additional questions or concerns.        Visit is not preventive.    Patient is established.    Patient reminded of date and time. Barriers addressed: none  Chief complaint confirmed.  Clarified visit purpose: Hospital Follow Up  Checked for changes of symptoms or new symptoms: Pt reports he's feeling better    Health Maintenance Due   Topic Date Due     PREVENTIVE CARE VISIT  1985     MICROALBUMIN  1985     DIABETIC FOOT EXAM  1985     ANNUAL REVIEW OF HM ORDERS  1984     EYE EXAM  1985     HIV SCREENING  03/18/1999     PNEUMOCOCCAL IMMUNIZATION 19-64 MEDIUM RISK (1 of 1 - PPSV23) 03/18/2003     HEPATITIS B IMMUNIZATION (1 of 3 - Risk 3-dose series) 03/18/2003     PHQ-2  01/01/2020     DTAP/TDAP/TD IMMUNIZATION (2 - Td) 05/27/2020     Patient is due for:  eye exam and preventive care visit  Will discuss with Provider    [facility]  Patient is not active on [employer]. Encouraged [employer] activation.  Sent text to Pt with [employer] setup instructions    Questionnaire Review   Offered information on completing questionnaires via [employer].    Call Summary  \"Thank you for your time today.  If anything comes up before your appointment, please feel free to contact us at [phone].\"    Answers for HPI/ROS submitted by the patient on 7/31/2020   Chronic problems general questions HPI Form  How many servings of fruits and vegetables do you eat daily?: 2-3  On average, how many sweetened beverages do you drink each day (Examples: soda, juice, sweet tea, etc.  Do NOT count diet or artificially " sweetened beverages)?: 2  How many minutes a day do you exercise enough to make your heart beat faster?: 60 or more  How many days a week do you exercise enough to make your heart beat faster?: 5  How many days per week do you miss taking your medication?: 7  What makes it hard for you to take your medication every day?: cost of medication  Frequency of checking blood sugars:: two times daily  What time of day are you checking your blood sugars : before meals  Have you had any blood sugars above 200?: No  Have you had any blood sugars below 70?: No  Hypoglycemia symptoms:: weakness  Diabetic concerns:: none  Paraesthesia present:: none of these symptoms  Have you had a diabetic eye exam within the last year?: No

## 2020-07-31 ENCOUNTER — OFFICE VISIT (OUTPATIENT)
Dept: FAMILY MEDICINE | Facility: CLINIC | Age: 35
End: 2020-07-31
Payer: COMMERCIAL

## 2020-07-31 VITALS
HEART RATE: 68 BPM | HEIGHT: 63 IN | RESPIRATION RATE: 14 BRPM | WEIGHT: 275 LBS | DIASTOLIC BLOOD PRESSURE: 78 MMHG | TEMPERATURE: 98.4 F | BODY MASS INDEX: 48.73 KG/M2 | SYSTOLIC BLOOD PRESSURE: 118 MMHG | OXYGEN SATURATION: 97 %

## 2020-07-31 DIAGNOSIS — E78.6 LOW HDL (UNDER 40): ICD-10-CM

## 2020-07-31 DIAGNOSIS — R74.8 ELEVATED LIVER ENZYMES: Primary | ICD-10-CM

## 2020-07-31 DIAGNOSIS — E11.9 TYPE 2 DIABETES, HBA1C GOAL < 7% (H): ICD-10-CM

## 2020-07-31 DIAGNOSIS — R79.89 ELEVATED TSH: ICD-10-CM

## 2020-07-31 PROCEDURE — 99495 TRANSJ CARE MGMT MOD F2F 14D: CPT | Performed by: FAMILY MEDICINE

## 2020-07-31 ASSESSMENT — MIFFLIN-ST. JEOR: SCORE: 2072.52

## 2020-07-31 NOTE — PROGRESS NOTES
Subjective     Darien Buitrago is a 35 year old male who presents to clinic today for the following health issues:    History of Present Illness        Diabetes:   He presents for follow up of diabetes.  He is checking home blood glucose two times daily. He checks blood glucose before meals.  Blood glucose is never over 200 and never under 70. He is aware of hypoglycemia symptoms including weakness. He has no concerns regarding his diabetes at this time.  He is not experiencing numbness or burning in feet, excessive thirst, blurry vision, weight changes or redness, sores or blisters on feet. The patient has not had a diabetic eye exam in the last 12 months.         He eats 2-3 servings of fruits and vegetables daily.He consumes 2 sweetened beverage(s) daily.He exercises with enough effort to increase his heart rate 60 or more minutes per day.  He exercises with enough effort to increase his heart rate 5 days per week. He is missing 7 dose(s) of medications per week.  He is not taking prescribed medications regularly due to cost of medication.           Hospital Follow-up Visit:    Hospital/Nursing Home/IP Rehab Facility: [facility]  Date of Admission: 7/21/20  Date of Discharge: 7/22/20  Reason(s) for Admission: Chest Pain      Was your hospitalization related to COVID-19? No   Problems taking medications regularly:  None  Medication changes since discharge: None  Problems adhering to non-medication therapy:  None    Summary of hospitalization:  [facility] discharge summary reviewed  Yes  Diagnostic Tests/Treatments reviewed.  Follow up needed: none  Other Healthcare Providers Involved in Patient s Care:         None  Update since discharge: improved. Post Discharge Medication Reconciliation: discharge medications reconciled, continue medications without change.  Plan of care communicated with patient              Diabetes Follow-up    How often are you checking your blood sugar? Two times  daily  Blood sugar testing frequency justification:  Uncontrolled diabetes  What time of day are you checking your blood sugars (select all that apply)?  Before meals  Have you had any blood sugars above 200?  No  Have you had any blood sugars below 70?  No    What symptoms do you notice when your blood sugar is low?  None    What concerns do you have today about your diabetes? None     Do you have any of these symptoms? (Select all that apply)  No numbness or tingling in feet.  No redness, sores or blisters on feet.  No complaints of excessive thirst.  No reports of blurry vision.  No significant changes to weight.    Have you had a diabetic eye exam in the last 12 months? No        BP Readings from Last 2 Encounters:   07/31/20 118/78   07/22/20 123/88     Hemoglobin A1C (%)   Date Value   07/21/2020 7.3 (H)     LDL Cholesterol Calculated (mg/dL)   Date Value   07/22/2020 119 (H)                 How many servings of fruits and vegetables do you eat daily?  2-3    On average, how many sweetened beverages do you drink each day (Examples: soda, juice, sweet tea, etc.  Do NOT count diet or artificially sweetened beverages)?   0    How many days per week do you exercise enough to make your heart beat faster? 5    How many minutes a day do you exercise enough to make your heart beat faster? 60 or more    How many days per week do you miss taking your medication? 0      Provider HPI: Patient was admitted to the hospital for chief concern of chest discomfort.  He had evaluation by cardiology, with his serum troponin level noted to be slightly elevated.  The remainder of his cardiology evaluation was unremarkable.  He was noted to have an elevated fasting glucose and hemoglobin A1c level.  He was started on management for diabetes mellitus.  A referral was made to the diabetic educator, which after discussion with me, he agreed to go to.  He stopped taking his metformin, last taking this on 07/23/2020. He only took this  "for one day. Starting on 07/24/2020, his blood sugars have been from 94, to 154, with most being in the 130s or lower.  He would very much like to try to control his diabetes through good diet and exercise.  His TSH was noted to be elevated during his hospitalization, with his free T4 being in normal range.  He was noted to have some dyslipidemia, and he was started on atorvastatin therapy.  He does not want to take this, preferring to address his lipid problems with good diet and exercise initially.  I noted on review of his hospital labs that his ALT and AST were both mildly elevated.  He does not recall discussing this with the provider.  He does not have any prior history of hepatitis.    Reviewed and updated as needed this visit by Provider             Review of Systems   See HPI.  He feels well overall at time of exam.  No recent shortness of breath, cough, fever, or chills.  No chest pain at time of exam.      Objective    /78 (BP Location: Right arm, Patient Position: Sitting, Cuff Size: Adult Large)   Pulse 68   Temp 98.4  F (36.9  C) (Oral)   Resp 14   Ht 1.6 m (5' 3\")   Wt 124.7 kg (275 lb)   SpO2 97%   BMI 48.71 kg/m    Body mass index is 48.71 kg/m .  Physical Exam   Vital signs reviewed.  Patient is in no acute appearing distress.  Breathing appears nonlabored.  Patient is alert and oriented ×3.  He makes good eye contact, is pleasant, and his speech is clear and fluent.  Heart: Heart rate is regular without murmur.  Lungs: Lungs are clear to auscultation with good airflow bilaterally.  Abdomen:  Abdomen is soft, nontender.  No palpable abnormal masses or organomegaly.  Bowel sounds are normal.  Back: No areas of tenderness.  Skin/extremities: Warm and dry, with no ankle edema.    No results found for any visits on 07/31/20.        Assessment & Plan     1. Elevated liver enzymes  We will recheck LFTs, and assess for possible infectious hepatitis because.  - **Hepatic panel FUTURE 2mo; " "Future  - Hepatitis A Antibody IgG; Future  - Hepatitis B surface antigen; Future  - Hepatitis B Surface Antibody; Future  - Hepatitis B core antibody; Future  - Hepatitis B core antibody IgM; Future  - Hepatitis C antibody; Future    2. Type 2 diabetes, HbA1c goal < 7% (H)  We will recheck hemoglobin A1c in approximately 3 months.  - Hemoglobin A1c; Future  - Lipid panel reflex to direct LDL Fasting; Future  - Albumin Random Urine Quantitative with Creat Ratio; Future    3. Low HDL (under 40)  We will recheck lipids in approximately 3 months.    4. Elevated TSH  We will recheck TSH with next lab draw in approximately 3 months.       BMI:   Estimated body mass index is 48.71 kg/m  as calculated from the following:    Height as of this encounter: 1.6 m (5' 3\").    Weight as of this encounter: 124.7 kg (275 lb).   Weight management plan: Discussed healthy diet and exercise guidelines        Patient Instructions   Try to get an eye exam soon. You should get a yearly exam. Try to go through diabetic education over the next couple months. See hand out for info on the Hgb A1c test. I will let you know your lab results and some thought via [employer].     Patient Education     A1C  Does this test have other names?  Hemoglobin A1c; HbA1c; glycosylated hemoglobin; glycohemoglobin; Glycated hemoglobin  What is this test?  A1C is a blood test that shows average blood sugar (glucose) levels over the last 3 months. The test is done to find out if a person has diabetes or prediabetes. It's also used to see how well a person with diabetes controls their blood sugar. The test can help guide diabetes treatment over time.  Why do I need this test?  You may need this test to check for prediabetes or diabetes.  If you have diabetes or prediabetes, you may need this test to see how well you control your blood sugar. People with diabetes need to track their blood sugar (glucose) levels every day to make sure they aren t too high or too low. " The A1C test gives results for a longer period of time. It shows if your blood sugar has been too high on average over the last 3 months.   Glucose sticks to hemoglobin in the blood. Hemoglobin is a protein in red blood cells that carries oxygen. When blood sugar is high, more glucose builds up and sticks to the hemoglobin. The A1C test measures how much of the hemoglobin is coated with sugar.  You may have the test when a healthcare provider first works with you to treat your diabetes. You may then need to have the A1C test 2 or more times a year. This depends on the type of diabetes you have and how well it s controlled. The American Diabetes Association (ADA) advises an A1C test at least 2 times a year if you are meeting your blood sugar goals. If you aren t meeting your goals or your medicine has changed, you should have the A1C test more often.  What other tests might I have along with this test?   If your healthcare provider tests you for diabetes, you may also have any of these tests:    Fasting plasma glucose blood test (FPG)    Oral glucose tolerance test (OGTT)    Urine test to check for sugar, ketones, or protein  What do my test results mean?  Test results may vary depending on your age, gender, health history, the method used for the test, and other things. Your test results may not mean you have a problem. Ask your healthcare provider what your test results mean for you.   A1C results are reported as a percentage. Here are what the results mean:    A1C below 5.7%. This is normal.    A1C from 5.7% to 6.4%. You may have prediabetes. This means you have a higher risk for diabetes in the future.    A1C of 6.5% or above on 2 separate tests. You may have diabetes.   The ADA says that people with diabetes should keep an A1C below 7%. The American Association of Clinical Endocrinologists advises an A1C of 6.5% or less. Your healthcare provider may give you other advice. This is based on your age, health  conditions, and other things.    How is this test done?  The test is done with a blood sample. A needle is used to draw blood from a vein in your arm or hand.   Does this test pose any risks?  Having a blood test with a needle carries some risks. These include bleeding, infection, bruising, and feeling lightheaded. When the needle pricks your arm or hand, you may feel a slight sting or pain. Afterward, the site may be sore.  What might affect my test results?  Your blood sugar levels change throughout the day. This won't affect the A1C test result.  If you have sickle cell anemia or other blood disorders, an A1C test may be less useful for diagnosing or watching diabetes. Your healthcare provider may tell you to use a different test that will work better for you.  The test results may be less accurate if you have any of the below:    Anemia    Heavy bleeding    Iron deficiency    Kidney failure    Liver disease  How do I get ready for this test?  You don't need to get ready for the test.      4328-5550 [employer]. [address] [place] [address], [place] [address]. All rights reserved. This information is not intended as a substitute for professional medical care. Always follow your healthcare professional's instructions.               Return in about 3 months (around 10/31/2020) for Video Visit, In-Clinic Visit.    Nomi Hart,   [facility]

## 2020-07-31 NOTE — PATIENT INSTRUCTIONS
Try to get an eye exam soon. You should get a yearly exam. Try to go through diabetic education over the next couple months. See hand out for info on the Hgb A1c test. I will let you know your lab results and some thought via [employer].     Patient Education     A1C  Does this test have other names?  Hemoglobin A1c; HbA1c; glycosylated hemoglobin; glycohemoglobin; Glycated hemoglobin  What is this test?  A1C is a blood test that shows average blood sugar (glucose) levels over the last 3 months. The test is done to find out if a person has diabetes or prediabetes. It's also used to see how well a person with diabetes controls their blood sugar. The test can help guide diabetes treatment over time.  Why do I need this test?  You may need this test to check for prediabetes or diabetes.  If you have diabetes or prediabetes, you may need this test to see how well you control your blood sugar. People with diabetes need to track their blood sugar (glucose) levels every day to make sure they aren t too high or too low. The A1C test gives results for a longer period of time. It shows if your blood sugar has been too high on average over the last 3 months.   Glucose sticks to hemoglobin in the blood. Hemoglobin is a protein in red blood cells that carries oxygen. When blood sugar is high, more glucose builds up and sticks to the hemoglobin. The A1C test measures how much of the hemoglobin is coated with sugar.  You may have the test when a healthcare provider first works with you to treat your diabetes. You may then need to have the A1C test 2 or more times a year. This depends on the type of diabetes you have and how well it s controlled. The American Diabetes Association (ADA) advises an A1C test at least 2 times a year if you are meeting your blood sugar goals. If you aren t meeting your goals or your medicine has changed, you should have the A1C test more often.  What other tests might I have along with this test?   If your  healthcare provider tests you for diabetes, you may also have any of these tests:    Fasting plasma glucose blood test (FPG)    Oral glucose tolerance test (OGTT)    Urine test to check for sugar, ketones, or protein  What do my test results mean?  Test results may vary depending on your age, gender, health history, the method used for the test, and other things. Your test results may not mean you have a problem. Ask your healthcare provider what your test results mean for you.   A1C results are reported as a percentage. Here are what the results mean:    A1C below 5.7%. This is normal.    A1C from 5.7% to 6.4%. You may have prediabetes. This means you have a higher risk for diabetes in the future.    A1C of 6.5% or above on 2 separate tests. You may have diabetes.   The ADA says that people with diabetes should keep an A1C below 7%. The American Association of Clinical Endocrinologists advises an A1C of 6.5% or less. Your healthcare provider may give you other advice. This is based on your age, health conditions, and other things.    How is this test done?  The test is done with a blood sample. A needle is used to draw blood from a vein in your arm or hand.   Does this test pose any risks?  Having a blood test with a needle carries some risks. These include bleeding, infection, bruising, and feeling lightheaded. When the needle pricks your arm or hand, you may feel a slight sting or pain. Afterward, the site may be sore.  What might affect my test results?  Your blood sugar levels change throughout the day. This won't affect the A1C test result.  If you have sickle cell anemia or other blood disorders, an A1C test may be less useful for diagnosing or watching diabetes. Your healthcare provider may tell you to use a different test that will work better for you.  The test results may be less accurate if you have any of the below:    Anemia    Heavy bleeding    Iron deficiency    Kidney failure    Liver disease  How  do I get ready for this test?  You don't need to get ready for the test.      6461-0654 [employer]. [address] [facility], [place] [address]. All rights reserved. This information is not intended as a substitute for professional medical care. Always follow your healthcare professional's instructions.

## 2020-08-04 ENCOUNTER — TELEPHONE (OUTPATIENT)
Dept: FAMILY MEDICINE | Facility: CLINIC | Age: 35
End: 2020-08-04

## 2020-08-04 NOTE — TELEPHONE ENCOUNTER
8/4/2020    Please assist schedule 3 mo Follow-up when Pt returns call ( Video Visit, In-Clinic Visit)

## 2020-08-20 ENCOUNTER — TELEPHONE (OUTPATIENT)
Facility: CLINIC | Age: 35
End: 2020-08-20

## 2020-08-20 NOTE — TELEPHONE ENCOUNTER
Diabetes Education Scheduling Outreach #1:    Call to patient to schedule. Patient asked for a call back tomorrow late morning.    Plan for 2nd outreach attempt within 1 business day.    Kasey Torres [facility]  Diabetes and Nutrition Scheduling

## 2020-08-21 NOTE — TELEPHONE ENCOUNTER
Diabetes Education Scheduling Outreach #2:    Call to patient to schedule. Left message with phone number to call to schedule.      Kasey Helms  [facility]  Diabetes and Nutrition Scheduling

## 2020-09-08 ENCOUNTER — NURSE TRIAGE (OUTPATIENT)
Dept: FAMILY MEDICINE | Facility: CLINIC | Age: 35
End: 2020-09-08

## 2020-09-08 NOTE — TELEPHONE ENCOUNTER
Panel Management Review      Patient has the following on his problem list:     Diabetes    ASA: Failed    Last A1C  Lab Results   Component Value Date    A1C 7.3 07/21/2020     A1C tested: FAILED    Last LDL:    Lab Results   Component Value Date    CHOL 175 07/22/2020     Lab Results   Component Value Date    HDL 37 07/22/2020     Lab Results   Component Value Date     07/22/2020     Lab Results   Component Value Date    TRIG 93 07/22/2020     No results found for: CHOLHDLRATIO  Lab Results   Component Value Date    NHDL 138 07/22/2020       Is the patient on a Statin? NO             Is the patient on Aspirin? NO        Last three blood pressure readings:  BP Readings from Last 3 Encounters:   07/31/20 118/78   07/22/20 123/88   07/21/20 104/80       Date of last diabetes office visit: 07/31/2020     Tobacco History:     History   Smoking Status     Never Smoker   Smokeless Tobacco     Never Used           Composite cancer screening  Chart review shows that this patient is due/due soon for the following None  Summary:    Patient is due/failing the following:   Diabetes eye exam      Action needed:   Pt needs a diabetes eye exam     Type of outreach:    Phone, spoke to patient.  pt was recently diagnosed with diabetes and has not yet had an eye exam. I offered the info on [facility] in [place] and pt refused. Pt will find his own eye clinic when he has the time.    Questions for provider review:    None                                                                                                                                    DEVEN Livingston       Chart routed to  .

## 2020-12-06 ENCOUNTER — HEALTH MAINTENANCE LETTER (OUTPATIENT)
Age: 35
End: 2020-12-06

## 2021-01-21 ENCOUNTER — OFFICE VISIT (OUTPATIENT)
Dept: FAMILY MEDICINE | Facility: CLINIC | Age: 36
End: 2021-01-21
Payer: COMMERCIAL

## 2021-01-21 VITALS
HEART RATE: 81 BPM | SYSTOLIC BLOOD PRESSURE: 112 MMHG | HEIGHT: 63 IN | DIASTOLIC BLOOD PRESSURE: 64 MMHG | RESPIRATION RATE: 16 BRPM | OXYGEN SATURATION: 96 % | BODY MASS INDEX: 49.96 KG/M2 | TEMPERATURE: 98.2 F | WEIGHT: 282 LBS

## 2021-01-21 DIAGNOSIS — R10.13 EPIGASTRIC PAIN: Primary | ICD-10-CM

## 2021-01-21 DIAGNOSIS — E11.9 TYPE 2 DIABETES MELLITUS WITHOUT COMPLICATION, WITHOUT LONG-TERM CURRENT USE OF INSULIN (H): ICD-10-CM

## 2021-01-21 LAB
ERYTHROCYTE [DISTWIDTH] IN BLOOD BY AUTOMATED COUNT: 13.4 % (ref 10–15)
HBA1C MFR BLD: 6.5 % (ref 0–5.6)
HCT VFR BLD AUTO: 44.8 % (ref 40–53)
HGB BLD-MCNC: 14.4 G/DL (ref 13.3–17.7)
MCH RBC QN AUTO: 30.9 PG (ref 26.5–33)
MCHC RBC AUTO-ENTMCNC: 32.1 G/DL (ref 31.5–36.5)
MCV RBC AUTO: 96 FL (ref 78–100)
PLATELET # BLD AUTO: 187 10E9/L (ref 150–450)
RBC # BLD AUTO: 4.66 10E12/L (ref 4.4–5.9)
WBC # BLD AUTO: 8 10E9/L (ref 4–11)

## 2021-01-21 PROCEDURE — 83690 ASSAY OF LIPASE: CPT | Performed by: PHYSICIAN ASSISTANT

## 2021-01-21 PROCEDURE — 83036 HEMOGLOBIN GLYCOSYLATED A1C: CPT | Performed by: PHYSICIAN ASSISTANT

## 2021-01-21 PROCEDURE — 90715 TDAP VACCINE 7 YRS/> IM: CPT | Performed by: PHYSICIAN ASSISTANT

## 2021-01-21 PROCEDURE — 80053 COMPREHEN METABOLIC PANEL: CPT | Performed by: PHYSICIAN ASSISTANT

## 2021-01-21 PROCEDURE — 85027 COMPLETE CBC AUTOMATED: CPT | Performed by: PHYSICIAN ASSISTANT

## 2021-01-21 PROCEDURE — 99214 OFFICE O/P EST MOD 30 MIN: CPT | Mod: 25 | Performed by: PHYSICIAN ASSISTANT

## 2021-01-21 PROCEDURE — 86140 C-REACTIVE PROTEIN: CPT | Performed by: PHYSICIAN ASSISTANT

## 2021-01-21 PROCEDURE — 90471 IMMUNIZATION ADMIN: CPT | Performed by: PHYSICIAN ASSISTANT

## 2021-01-21 PROCEDURE — 36415 COLL VENOUS BLD VENIPUNCTURE: CPT | Performed by: PHYSICIAN ASSISTANT

## 2021-01-21 PROCEDURE — 82043 UR ALBUMIN QUANTITATIVE: CPT | Performed by: PHYSICIAN ASSISTANT

## 2021-01-21 ASSESSMENT — MIFFLIN-ST. JEOR: SCORE: 2104.27

## 2021-01-21 NOTE — PROGRESS NOTES
"Future Appointments   Date Time Provider Department Center   1/21/2021  5:30 PM Sarai Elkins PA-C [facility]     Appointment Notes for this encounter:   discomfort near stomach area, wants to make sure it's nothing serious    Health Maintenance Due   Topic Date Due     PREVENTIVE CARE VISIT  1984     MICROALBUMIN  1984     DIABETIC FOOT EXAM  1984     ANNUAL REVIEW OF HM ORDERS  1984     EYE EXAM  1984     Pneumococcal Vaccine: Pediatrics (0 to 5 Years) and At-Risk Patients (6 to 64 Years) (1 of 1 - PPSV23) 03/18/1990     HIV SCREENING  03/18/1999     HEPATITIS C SCREENING  03/18/2002     HEPATITIS B IMMUNIZATION (1 of 3 - Risk 3-dose series) 03/18/2003     A1C  10/21/2020     Health Maintenance addressed:  Eye Exam  Will try for DELMIS, but does not remember his last eye exam      MyChart Status:  Active and Using       Assessment & Plan     Epigastric pain  Likely peptic ulcer disease.  Will start on two week course of prilosec if labs are normal.  But due to family history of stomach cancer will get other labs today as well.  Pt will try to adjust his diet to minimize gerd symptoms.  - Comprehensive metabolic panel  - Lipase  - CBC with platelets  - CRP inflammation    Type 2 diabetes mellitus without complication, without long-term current use of insulin (H)  Overdue for labs.  - Hemoglobin A1c  - Albumin Random Urine Quantitative with Creat Ratio         BMI:   Estimated body mass index is 49.95 kg/m  as calculated from the following:    Height as of this encounter: 1.6 m (5' 3\").    Weight as of this encounter: 127.9 kg (282 lb).   Weight management plan: working on diet      Return in about 1 month (around 2/21/2021) for Recheck if not improving.    Sarai Elkins PA-C  [facility] YUNIER Ledezma is a 35 year old who presents to clinic today for the following health issues     History of Present Illness       He eats 0-1 " "servings of fruits and vegetables daily.He consumes 1 sweetened beverage(s) daily.He exercises with enough effort to increase his heart rate 9 or less minutes per day.  He exercises with enough effort to increase his heart rate 3 or less days per week.   He is taking medications regularly.         Abdominal/Flank  Onset/Duration: x 3 weeks, has not worsened during that time.  Pushing on the pain makes it feel a little better.    Description:   Character: Dull ache and discomfort  Location: upper stomach, a little to the left  Radiation: None  Intensity: moderate  Progression of Symptoms:  same  Accompanying Signs & Symptoms:  Fever/Chills: no  Gas/Bloating: no  Nausea: no  Vomitting: no  Diarrhea: no  Constipation: no  Dysuria or Hematuria: no  History:   Trauma: no  Previous similar pain: issue in the past  Previous tests done: none  Precipitating factors:   Does the pain change with:     Food: no    Bowel Movement: no    Urination: no   Other factors:  no  Therapies tried and outcome: None    Doesn't usually get heart burn.  Has been eating more peppers and spicy food.  Notes pain as a dull ache that doesn't change with position, food intake or bowel movements.  Has had in the past but not as frequently as the last three weeks.  His father was recently diagnosed with stage 4 stomach cancer.        Review of Systems   Constitutional, HEENT, cardiovascular, pulmonary, gi and gu systems are negative, except as otherwise noted.      Objective    /64 (BP Location: Right arm, Patient Position: Chair, Cuff Size: Adult Large)   Pulse 81   Temp 98.2  F (36.8  C) (Oral)   Resp 16   Ht 1.6 m (5' 3\")   Wt 127.9 kg (282 lb)   SpO2 96%   BMI 49.95 kg/m    Body mass index is 49.95 kg/m .  Physical Exam   GENERAL: healthy, alert and no distress  RESP: lungs clear to auscultation - no rales, rhonchi or wheezes  CV: regular rate and rhythm, normal S1 S2, no S3 or S4, no murmur, click or rub, no peripheral edema and " peripheral pulses strong  ABDOMEN: soft, nontender, no hepatosplenomegaly, no masses and bowel sounds normal  MS: no gross musculoskeletal defects noted, no edema  SKIN: no suspicious lesions or rashes  PSYCH: mentation appears normal, affect normal/bright    Labs pending

## 2021-01-21 NOTE — NURSING NOTE
Prior to immunization administration, verified patients identity using patient s name and date of birth. Please see Immunization Activity for additional information.     Screening Questionnaire for Adult Immunization    Are you sick today?   No   Do you have allergies to medications, food, a vaccine component or latex?   No   Have you ever had a serious reaction after receiving a vaccination?   No   Do you have a long-term health problem with heart, lung, kidney, or metabolic disease (e.g., diabetes), asthma, a blood disorder, no spleen, complement component deficiency, a cochlear implant, or a spinal fluid leak?  Are you on long-term aspirin therapy?   No   Do you have cancer, leukemia, HIV/AIDS, or any other immune system problem?   No   Do you have a parent, brother, or sister with an immune system problem?   No   In the past 3 months, have you taken medications that affect  your immune system, such as prednisone, other steroids, or anticancer drugs; drugs for the treatment of rheumatoid arthritis, Crohn s disease, or psoriasis; or have you had radiation treatments?   No   Have you had a seizure, or a brain or other nervous system problem?   No   During the past year, have you received a transfusion of blood or blood    products, or been given immune (gamma) globulin or antiviral drug?   No   For women: Are you pregnant or is there a chance you could become       pregnant during the next month?   No   Have you received any vaccinations in the past 4 weeks?   No     Immunization questionnaire answers were all negative.        Per orders of Dr. sprague, injection of tdap given by Kam Lucas CMA. Patient instructed to remain in clinic for 15 minutes afterwards, and to report any adverse reaction to me immediately.       Screening performed by Kam Lucas CMA on 1/21/2021 at 5:23 PM.  2

## 2021-01-22 LAB
ALBUMIN SERPL-MCNC: 3.8 G/DL (ref 3.4–5)
ALP SERPL-CCNC: 41 U/L (ref 40–150)
ALT SERPL W P-5'-P-CCNC: 50 U/L (ref 0–70)
ANION GAP SERPL CALCULATED.3IONS-SCNC: 7 MMOL/L (ref 3–14)
AST SERPL W P-5'-P-CCNC: 28 U/L (ref 0–45)
BILIRUB SERPL-MCNC: 0.9 MG/DL (ref 0.2–1.3)
BUN SERPL-MCNC: 16 MG/DL (ref 7–30)
CALCIUM SERPL-MCNC: 8.8 MG/DL (ref 8.5–10.1)
CHLORIDE SERPL-SCNC: 111 MMOL/L (ref 94–109)
CO2 SERPL-SCNC: 23 MMOL/L (ref 20–32)
CREAT SERPL-MCNC: 0.96 MG/DL (ref 0.66–1.25)
CREAT UR-MCNC: 183 MG/DL
CRP SERPL-MCNC: <2.9 MG/L (ref 0–8)
GFR SERPL CREATININE-BSD FRML MDRD: >90 ML/MIN/{1.73_M2}
GLUCOSE SERPL-MCNC: 118 MG/DL (ref 70–99)
LIPASE SERPL-CCNC: 107 U/L (ref 73–393)
MICROALBUMIN UR-MCNC: <5 MG/L
MICROALBUMIN/CREAT UR: NORMAL MG/G CR (ref 0–17)
POTASSIUM SERPL-SCNC: 3.7 MMOL/L (ref 3.4–5.3)
PROT SERPL-MCNC: 7.9 G/DL (ref 6.8–8.8)
SODIUM SERPL-SCNC: 141 MMOL/L (ref 133–144)

## 2021-01-25 NOTE — RESULT ENCOUNTER NOTE
Saroj Ledezma,    I just wanted to let you know that your lab results have been reviewed and are attached.    - Your lab results look stable; everything is normal.  Your diabetes labs have improved.  There is no indication of abdominal infection or bleed.  If you're still having pain, we can try starting you on an acid reducing medication to see if that helps.  Let me know.    Please let me know if you have any questions and have a great week!    Sincerely,    Caryn Elkins PA-C    [facility]  [address] [place] [address]  Clinic Phone: [phone]

## 2021-06-05 ENCOUNTER — HEALTH MAINTENANCE LETTER (OUTPATIENT)
Age: 36
End: 2021-06-05

## 2021-09-03 ENCOUNTER — PATIENT OUTREACH (OUTPATIENT)
Dept: FAMILY MEDICINE | Facility: CLINIC | Age: 36
End: 2021-09-03

## 2021-09-03 ENCOUNTER — OFFICE VISIT (OUTPATIENT)
Dept: FAMILY MEDICINE | Facility: CLINIC | Age: 36
End: 2021-09-03
Payer: COMMERCIAL

## 2021-09-03 VITALS
HEART RATE: 75 BPM | TEMPERATURE: 97.8 F | OXYGEN SATURATION: 99 % | BODY MASS INDEX: 50.57 KG/M2 | SYSTOLIC BLOOD PRESSURE: 122 MMHG | DIASTOLIC BLOOD PRESSURE: 74 MMHG | WEIGHT: 285.5 LBS

## 2021-09-03 DIAGNOSIS — L73.1 INGROWN HAIR: Primary | ICD-10-CM

## 2021-09-03 DIAGNOSIS — E11.9 TYPE 2 DIABETES MELLITUS WITHOUT COMPLICATION, WITHOUT LONG-TERM CURRENT USE OF INSULIN (H): ICD-10-CM

## 2021-09-03 LAB — HBA1C MFR BLD: 8.3 % (ref 0–5.6)

## 2021-09-03 PROCEDURE — 80061 LIPID PANEL: CPT | Performed by: PHYSICIAN ASSISTANT

## 2021-09-03 PROCEDURE — 99207 PR FOOT EXAM NO CHARGE: CPT | Mod: 25 | Performed by: PHYSICIAN ASSISTANT

## 2021-09-03 PROCEDURE — 36415 COLL VENOUS BLD VENIPUNCTURE: CPT | Performed by: PHYSICIAN ASSISTANT

## 2021-09-03 PROCEDURE — 99213 OFFICE O/P EST LOW 20 MIN: CPT | Performed by: PHYSICIAN ASSISTANT

## 2021-09-03 PROCEDURE — 83036 HEMOGLOBIN GLYCOSYLATED A1C: CPT | Performed by: PHYSICIAN ASSISTANT

## 2021-09-03 NOTE — PROGRESS NOTES
"    Assessment & Plan     Ingrown hair  Signs of ingrown hair causing these bumps patient is finding. Discussed what they are and ways to prevent.    Type 2 diabetes mellitus without complication, without long-term current use of insulin (H)  Due for labs. Foot exam performed today.   Offered to have patient scheduled for a virtual follow up for diabetes. He declines today.  - Lipid panel reflex to direct LDL Non-fasting; Future  - Hemoglobin A1c; Future  - FOOT EXAM  - Lipid panel reflex to direct LDL Non-fasting  - Hemoglobin A1c    Review of the result(s) of each unique test - A1c  Ordering of each unique test    BMI:   Estimated body mass index is 50.57 kg/m  as calculated from the following:    Height as of 1/21/21: 1.6 m (5' 3\").    Weight as of this encounter: 129.5 kg (285 lb 8 oz).   Discussed diet.    Return if symptoms worsen or fail to improve.    KALEY Rodriguez [facility] LUIS MIGUEL Ledezma is a 36 year old who presents for the following health issues    HPI     Concern - BUMPS ON GROIN AREA  Onset: 3 weeks to a month  Description: Two bumps came up that were kind itchy that is how patient first noticed, and now theres three. Not itchy anymore but a little tender to the touch, wife wanted him to get them check out.  Intensity: mild  Progression of Symptoms:  same      Review of Systems   GENERAL:  No fevers         Objective    /74 (BP Location: Right arm, Patient Position: Sitting, Cuff Size: Adult Regular)   Pulse 75   Temp 97.8  F (36.6  C) (Oral)   Wt 129.5 kg (285 lb 8 oz)   SpO2 99%   BMI 50.57 kg/m    Body mass index is 50.57 kg/m .  Physical Exam   GENERAL: No acute distress  HEENT: Normocephalic  VASCULAR: 2+ DP and PT pulses bilaterally  EXTREMITIES:   Right lower extremity: No obvious deformity, no edema or ecchymosis. Sensation present with monofilament testing. No ulcers, skin breakdown or open wounds.  Left lower extremity: No obvious " deformity, no edema or ecchymosis. Sensation present with monofilament testing. No ulcers, skin breakdown or open wounds.  SKIN: Mariano RAUSCH present as chaperone: Two healing ingrown hairs over the left upper groin, 1 current ingrown hair more midline in the pubis area.  NEURO: Alert, non-focal      Results for orders placed or performed in visit on 09/03/21 (from the past 24 hour(s))   Hemoglobin A1c   Result Value Ref Range    Hemoglobin A1C 8.3 (H) 0.0 - 5.6 %

## 2021-09-03 NOTE — TELEPHONE ENCOUNTER
PAL RN called pt with Hgb A1C8.3 %High results re:      Please call patient and let him know that his A1c has gone up to 8.3%. I would recommend he make a follow up visit with his PCP to discuss the diabetes. Can likely be virtual. Luz Hart PA-C.    ANNY RN spoke w/pt and explained above instructions to pt w/verbal understanding from pt. PAL RN explained to pt how insulin works in pt's body when pt asked and how certain anti-diabetic agents work help a body produce insulin.  Pt will contact his PCP and schedule an appointment.    Lelo Schmidt, Registered Nurse, PAL (Patient Advocate Liaison) [facility]     [phone]'

## 2021-09-04 LAB
CHOLEST SERPL-MCNC: 194 MG/DL
FASTING STATUS PATIENT QL REPORTED: NO
HDLC SERPL-MCNC: 34 MG/DL
LDLC SERPL CALC-MCNC: 142 MG/DL
NONHDLC SERPL-MCNC: 160 MG/DL
TRIGL SERPL-MCNC: 92 MG/DL

## 2021-09-25 ENCOUNTER — HEALTH MAINTENANCE LETTER (OUTPATIENT)
Age: 36
End: 2021-09-25

## 2022-01-03 ENCOUNTER — E-VISIT (OUTPATIENT)
Dept: URGENT CARE | Facility: URGENT CARE | Age: 37
End: 2022-01-03
Payer: COMMERCIAL

## 2022-01-03 DIAGNOSIS — Z20.822 SUSPECTED COVID-19 VIRUS INFECTION: Primary | ICD-10-CM

## 2022-01-03 PROCEDURE — 99421 OL DIG E/M SVC 5-10 MIN: CPT | Performed by: PHYSICIAN ASSISTANT

## 2022-01-03 NOTE — PATIENT INSTRUCTIONS
Toua,      Based on your responses, you may have coronavirus (COVID-19). This illness can cause fever, cough and trouble breathing. Many people get a mild case and get better on their own. Some people can get very sick.    Will I be tested for COVID-19?  We would like to test you for COVID-19 virus. I have placed orders for this test.     To schedule: go to your [employer] home page and scroll down to the section that says  You have an appointment that needs to be scheduled  and click the large green button that says  Schedule Now  and follow the steps to find the next available openings.    If you are unable to complete these [employer] scheduling steps, please call [phone] to schedule your testing.     Return to work/school/ guidance:  Please let your workplace manager and staffing office know when your isolation ends.       If you receive a positive COVID-19 test result, follow the guidance of the those who are giving you the results. Usually the return to work is 10 days from symptom onset or positive test date, (or in some cases 20 days if you are immunocompromised). If your symptoms started after your positive test, the 10 days should start when your symptoms started.   o If you work at [employer], you must also be cleared by Employee Occupational Health and Safety to return to work.      If you receive a negative COVID-19 test result and did not have a high risk exposure to someone with a known positive COVID-19 test, you can return to work once you're free of fever for 24 hours without fever-reducing medication and your symptoms are improving or resolved.    If you receive a negative COVID-19 test and had a high-risk exposure to someone who has tested positive for COVID-19 then you can return to work 14 days after your last contact with the positive individual. Follow quarantine guidance given by your doctor or public health officials.     Sign up for GetWell Loop:  We know it's scary to hear  that you might have COVID-19. We want to track your symptoms to make sure you're okay over the next 2 weeks. Please look for an email from [employer]--this is a free, online program that we'll use to keep in touch. To sign up, follow the link in the email you will receive. Learn more at [email or web address]    How can I take care of myself?  Over the counter medications may help with your symptoms like congestion, cough, chills, or fever.    There are not many effective prescription treatments for early COVID-19. Hydroxychloroquine, ivermectin, and azithromycin are not effective or recommended for COVID-19.    If your symptoms started in the last 10 days, you may be able to receive a treatment with monoclonal antibodies. This treatment can lower your risk of severe illness and going to the hospital. It is given through an IV or under your skin (subcutaneous) and must be given at an infusion center. You must be 12 or older, weight at least 88 pounds, and have a positive COVID-19 test.     If you would like to sign up to be considered to receive the monoclonal antibody medicine, please complete a participation form through the [facility] here: MNRAP ([email or web address].[email or web address].us/diseases/[email or web address]/[email or web address]). You may also call the [facility] COVID-19 Public Hotline at 1-749.613.4712 (open Mon-Fri: 9am-7pm and Sat: 10am-6pm).     Not all people who are eligible will receive the medicine, since supply is limited. You will be contacted in the next 1 to 2 business days only if you are selected. If you do not receive a call, you have not been selected to receive the medicine. If you have any questions about this medication, please contact your primary care provider. For more information, see [email or web address].[place]/diseases/[email or web address]/[email or web address].pdf      Get lots of rest. Drink extra fluids (unless a doctor has told you not to)    Take Tylenol (acetaminophen) or ibuprofen for fever or  pain. If you have liver or kidney problems, ask your family doctor if it's okay to take Tylenol o ibuprofen    Take over the counter medications for your symptoms, as directed by your doctor. You may also talk to your pharmacist.      If you have other health problems (like cancer, heart failure, an organ transplant or severe kidney disease): Call your specialty clinic if you don't feel better in the next 2 days.    Know when to call 911. Emergency warning signs include:  o Trouble breathing or shortness of breath  o Pain or pressure in the chest that doesn't go away  o Feeling confused like you haven't felt before, or not being able to wake up  o Bluish-colored lips or face    Where can I get more information?    [facility] - About COVID-19: [email or web address]     CDC - What to Do If You're Sick:     www.cdc.gov/coronavirus/2019-ncov/about/steps-when-sick.html    CDC - Ending Home Isolation:  https://www.cdc.gov/coronavirus/2019-ncov/your-health/quarantine-isolation.html    CDC - Caring for Someone:  www.cdc.gov/coronavirus/2019-ncov/if-you-are-sick/care-for-someone.html    [facility] clinical trials (COVID-19 research studies): clinicalaffairs.[facility]-clinical-trials    Below are the COVID-19 hotlines at the [facility] ([facility]). Interpreters are available.  o For health questions: Call 916-472-8814 or 1-516.619.9835 (7 a.m. to 7 p.m.)  o For questions about schools and childcare: Call 886-247-6276 or 1-580.852.7643 (7 a.m. to 7 p.m.)  January 3, 2022  RE:  Darien Buitrago                                                                                                                  [id number] [facility] [address]      To whom it may concern:    I evaluated Darien Buitrago on January 3, 2022. Darien Buitrago should be excused from work/school.     They should let their workplace manager and staffing office know when their quarantine ends.    We can not give an  exact date as it depends on the information below. They can calculate this on their own or work with their manager/staffing office to calculate this. (For example if they were exposed on 10/04, they would have to quarantine for 14 full days. That would be through 10/18. They could return on 10/19.)    Quarantine Guidelines:    If patient receives a positive COVID-19 test result, they should follow the guidance of those who are giving the results. Usually the return to work is 10 (or in some cases 20 days from symptom onset.) If they work at [employer], they must be cleared by Employee Occupational Health and Safety to return to work.      If patient receives a negative COVID-19 test result and did not have a high risk exposure to someone with a known positive COVID-19 test, they can return to work once they're free of fever for 24 hours without fever-reducing medication and their symptoms are improving or resolved.    If patient receives a negative COVID-19 test and if they had a high risk exposure to someone who has tested positive for COVID-19 then they can return to work 14 days after their last contact with the positive individual    Note: If there is ongoing exposure to the covid positive person, this quarantine period may be longer than 14 days. (For example, if they are continually exposed to their child, who tested positive and cannot isolate from them, then the quarantine of 7-14 days can't start until their child is no longer contagious. This is typically 10 days from onset to the child's symptoms. So the total duration may be 17-24 days in this case.)     Sincerely,  Avinash Duarte PA-C          o

## 2022-01-06 ENCOUNTER — LAB (OUTPATIENT)
Dept: URGENT CARE | Facility: URGENT CARE | Age: 37
End: 2022-01-06
Attending: PHYSICIAN ASSISTANT
Payer: COMMERCIAL

## 2022-01-06 DIAGNOSIS — Z20.822 SUSPECTED COVID-19 VIRUS INFECTION: ICD-10-CM

## 2022-01-06 LAB — SARS-COV-2 RNA RESP QL NAA+PROBE: POSITIVE

## 2022-01-06 PROCEDURE — U0003 INFECTIOUS AGENT DETECTION BY NUCLEIC ACID (DNA OR RNA); SEVERE ACUTE RESPIRATORY SYNDROME CORONAVIRUS 2 (SARS-COV-2) (CORONAVIRUS DISEASE [COVID-19]), AMPLIFIED PROBE TECHNIQUE, MAKING USE OF HIGH THROUGHPUT TECHNOLOGIES AS DESCRIBED BY CMS-2020-01-R: HCPCS

## 2022-01-06 PROCEDURE — U0005 INFEC AGEN DETEC AMPLI PROBE: HCPCS

## 2022-01-07 ENCOUNTER — TELEPHONE (OUTPATIENT)
Dept: URGENT CARE | Facility: URGENT CARE | Age: 37
End: 2022-01-07
Payer: COMMERCIAL

## 2022-01-08 NOTE — TELEPHONE ENCOUNTER
"Coronavirus (COVID-19) Notification    Spoke to pt, and unidentified female in the background intermittently speaking on pt's behalf when asked about sx onset. She started using profanity \"I'm his wife!! He doesn't know much about health/medical terms that's why I'm answering and talking. I don't want to talk to her, she's a B!!\" Continued use of profanity, I explained that there are HIPAA rules I need to follow, and I had to make sure it was ok w/ pt to speak w/ him about this w/ others in the background. Pt apologized and stated for me to continue when I asked if it's ok for me to continue or if he could call us back when it's a better time. He mentioned that he was positive since last week (12/27-12/28), and has been testing positive at home and doesn't know why. Sunday Jan 2,  he felt better still positive, this past Tuesday he no longer had sx. Explained to him it is recommended not to retest because potentially keep testing positive for up to 90d. Pt understood. [employer] will have notice needed for work.      Caller Name (Patient, parent, daughter/son, grandparent, etc)  Pt    Reason for call  Notify of Positive Coronavirus (COVID-19) lab results, assess symptoms,  review [facility] recommendations    Lab Result    Lab test:  2019-nCoV rRt-PCR or SARS-CoV-2 PCR    Oropharyngeal AND/OR nasopharyngeal swabs is POSITIVE for 2019-nCoV RNA/SARS-COV-2 PCR (COVID-19 virus)    RN Recommendations/Instructions per [facility] Coronavirus COVID-19 recommendations    Brief introduction script  Introduce self then review script:  \"I am calling on behalf of [employer].  We were notified that your Coronavirus test (COVID-19) for was POSITIVE for the virus.  I have some information to relay to you but first I wanted to mention that the [place] Dept of Health will be contacting you shortly [it's possible MD already called Patient] to talk to you more about how you are feeling and other people you have had " "contact with who might now also have the virus.  Also, [facility] is Partnering with the [facility] for Covid-19 research, you may be contacted directly by research staff.\"    Assessment (Inquire about Patient's current symptoms)   Assessment   Current Symptoms at time of phone call: (if no symptoms, document No symptoms] Dry cough   Symptoms onset (if applicable) 12/27-12/28     If at time of call, Patients symptoms hare worsened, the Patient should contact 911 or have someone drive them to Emergency Dept promptly:      If Patient calling 911, inform 911 personal that you have tested positive for the Coronavirus (COVID-19).  Place mask on and await 911 to arrive.    If Emergency Dept, If possible, please have another adult drive you to the Emergency Dept but you need to wear mask when in contact with other people.      Monoclonal Antibody Administration    You may be eligible to receive a new treatment with a monoclonal antibody for preventing hospitalization in patients at high risk for complications from COVID-19.   This medication is still experimental and available on a limited basis; it is given through an IV and must be given at an infusion center. Please note that not all people who are eligible will receive the medication since it is in limited supply.     Are you interested in being considered for this medication?  No.   Does the patient fit the criteria: No    Review information with Patient    Your result was positive. This means you have COVID-19 (coronavirus).  We have sent you a letter that reviews the information that I'll be reviewing with you now.    How can I protect others?    If you have symptoms: stay home and away from others (self-isolate) until:    You've had no fever--and no medicine that reduces fever--for 1 full day (24 hours). And       Your other symptoms have gotten better. For example, your cough or breathing has improved. And     At least 10 days have passed since your " symptoms started. (If you've been told by a doctor that you have a weak immune system, wait 20 days.)     If you don't have symptoms: Stay home and away from others (self-isolate) until at least 10 days have passed since your first positive COVID-19 test. (Date test collected)    During this time:    Stay in your own room, including for meals. Use your own bathroom if you can.    Stay away from others in your home. No hugging, kissing or shaking hands. No visitors.     Don't go to work, school or anywhere else.     Clean  high touch  surfaces often (doorknobs, counters, handles, etc.). Use a household cleaning spray or wipes. You'll find a full list on the EPA website at www.epa.gov/pesticide-registration/list-n-disinfectants-use-against-sars-cov-2.     Cover your mouth and nose with a mask, tissue or other face covering to avoid spreading germs.    Wash your hands and face often with soap and water.    Make a list of people you have been in close contact with recently, even if either of you wore a face covering.   - Start your list from 2 days before you became ill or had a positive test.  - Include anyone that was within 6 feet of you for a cumulative total of 15 minutes or more in 24 hours. (Example: if you sat next to Ector for 5 minutes in the morning and 10 minutes in the afternoon, then you were in close contact for 15 minutes total that day. Ector would be added to your list.)    A public health worker will call or text you. It is important that you answer. They will ask you questions about possible exposures to COVID-19, such as people you have been in direct contact with and places you have visited.    Tell the people on your list that you have COVID-19; they should stay away from others for 14 days starting from the last time they were in contact with you (unless you are told something different from a public health worker).     Caregivers in these groups are at risk for severe illness due to  COVID-19:  o People 65 years and older  o People who live in a nursing home or long-term care facility  o People with chronic disease (lung, heart, cancer, diabetes, kidney, liver, immunologic)  o People who have a weakened immune system, including those who:  - Are in cancer treatment  - Take medicine that weakens the immune system, such as corticosteroids  - Had a bone marrow or organ transplant  - Have an immune deficiency  - Have poorly controlled HIV or AIDS  - Are obese (body mass index of 40 or higher)  - Smoke regularly    Caregivers should wear gloves while washing dishes, handling laundry and cleaning bedrooms and bathrooms.    Wash and dry laundry with special caution. Don't shake dirty laundry, and use the warmest water setting you can.    If you have a weakened immune system, ask your doctor about other actions you should take.    For more tips, go to www.cdc.gov/coronavirus/2019-ncov/downloads/10Things.pdf.    You should not go back to work until you meet the guidelines above for ending your home isolation. You don't need to be retested for COVID-19 before going back to work--studies show that you won't spread the virus if it's been at least 10 days since your symptoms started (or 20 days, if you have a weak immune system).    Employers: This document serves as formal notice of your employee's medical guidelines for going back to work. They must meet the above guidelines before going back to work in person.    How can I take care of myself?    1. Get lots of rest. Drink extra fluids (unless a doctor has told you not to).    2. Take Tylenol (acetaminophen) for fever or pain. If you have liver or kidney problems, ask your family doctor if it's okay to take Tylenol.     Take either:     650 mg (two 325 mg pills) every 4 to 6 hours, or     1,000 mg (two 500 mg pills) every 8 hours as needed.     Note: Don't take more than 3,000 mg in one day. Acetaminophen is found in many medicines (both prescribed and  over-the-counter medicines). Read all labels to be sure you don't take too much.    For children, check the Tylenol bottle for the right dose (based on their age or weight).    3. If you have other health problems (like cancer, heart failure, an organ transplant or severe kidney disease): Call your specialty clinic if you don't feel better in the next 2 days.    4. Know when to call 911: Emergency warning signs include:    Trouble breathing or shortness of breath    Pain or pressure in the chest that doesn't go away    Feeling confused like you haven't felt before, or not being able to wake up    Bluish-colored lips or face    5. Sign up for [employer]. We know it's scary to hear that you have COVID-19. We want to track your symptoms to make sure you're okay over the next 2 weeks. Please look for an email from [employer]--this is a free, online program that we'll use to keep in touch. To sign up, follow the link in the email. Learn more at [email or web address].    Where can I get more information?    [facility]: [email or web address]    Coronavirus Basics: www.health.[facility].[place].us/diseases/coronavirus/basics.html    What to Do If You're Sick: www.cdc.gov/coronavirus/2019-ncov/about/steps-when-sick.html    Ending Home Isolation: www.cdc.gov/coronavirus/2019-ncov/hcp/disposition-in-home-patients.html     Caring for Someone with COVID-19: www.cdc.gov/coronavirus/2019-ncov/if-you-are-sick/care-for-someone.html     [facility] clinical trials (COVID-19 research studies): clinicalaffairs.[facility]/umn-clinical-trials     A Positive COVID-19 letter will be sent via [employer] or the mail. (Exception, no letters sent to Presurgerical/Preprocedure Patients)    Meryl Palomino

## 2022-01-15 ENCOUNTER — HEALTH MAINTENANCE LETTER (OUTPATIENT)
Age: 37
End: 2022-01-15

## 2022-03-04 ENCOUNTER — TELEPHONE (OUTPATIENT)
Dept: FAMILY MEDICINE | Facility: CLINIC | Age: 37
End: 2022-03-04

## 2022-03-04 NOTE — TELEPHONE ENCOUNTER
"PAL called patient to begin checking them in for a virtual visit. Patient was being seen for a \"referral\". PAL inquired about what type of referral was being sought. Patient states \"My mood and emotions have been unstable since my father passed.\"     PAL rescheduled patient for next week. PAL assigned Bay-7 and PHQ-9 and informed patient to log into Lua in the time between and complete these questionnaires.     Patient was buying a headstone for father today and had to reschedule.    PAL will forward to PCP.     Steve Hdez, [facility]  [phone]  March 4, 2022, 12:45 PM  "

## 2022-05-07 ENCOUNTER — HEALTH MAINTENANCE LETTER (OUTPATIENT)
Age: 37
End: 2022-05-07

## 2022-07-20 ENCOUNTER — TELEPHONE (OUTPATIENT)
Dept: FAMILY MEDICINE | Facility: CLINIC | Age: 37
End: 2022-07-20

## 2022-07-20 NOTE — LETTER
July 20, 2022      Darien Iftikhar  [address] [facility] [address]        Dear Darien,     We have tried to reach you about scheduling an appointment for your diabetic check, which you are over due for.   This should be an in clinic visit as you are over due for lab work as well.     You can make an appointment through my chart or by calling the clinic at [phone]      Sincerely,        Sharmaine Marinelli MD

## 2022-07-20 NOTE — TELEPHONE ENCOUNTER
LM for call back - pt has not been seen in some time for PCP/DM     LM and sent letter as he does not read my chart    Kimberley Bansal RN

## 2022-08-27 ENCOUNTER — HEALTH MAINTENANCE LETTER (OUTPATIENT)
Age: 37
End: 2022-08-27

## 2023-01-07 ENCOUNTER — HEALTH MAINTENANCE LETTER (OUTPATIENT)
Age: 38
End: 2023-01-07

## 2023-03-24 ENCOUNTER — OFFICE VISIT (OUTPATIENT)
Dept: FAMILY MEDICINE | Facility: CLINIC | Age: 38
End: 2023-03-24
Payer: COMMERCIAL

## 2023-03-24 VITALS
HEART RATE: 64 BPM | OXYGEN SATURATION: 98 % | WEIGHT: 280 LBS | HEIGHT: 62 IN | SYSTOLIC BLOOD PRESSURE: 122 MMHG | BODY MASS INDEX: 51.53 KG/M2 | RESPIRATION RATE: 20 BRPM | DIASTOLIC BLOOD PRESSURE: 80 MMHG | TEMPERATURE: 97.9 F

## 2023-03-24 DIAGNOSIS — E11.9 TYPE 2 DIABETES MELLITUS WITHOUT COMPLICATION, WITHOUT LONG-TERM CURRENT USE OF INSULIN (H): ICD-10-CM

## 2023-03-24 DIAGNOSIS — L82.1 SEBORRHEIC KERATOSIS: ICD-10-CM

## 2023-03-24 DIAGNOSIS — Z00.00 ROUTINE GENERAL MEDICAL EXAMINATION AT A HEALTH CARE FACILITY: ICD-10-CM

## 2023-03-24 DIAGNOSIS — R74.8 ELEVATED LIVER ENZYMES: ICD-10-CM

## 2023-03-24 LAB
ALBUMIN SERPL BCG-MCNC: 4.4 G/DL (ref 3.5–5.2)
ALP SERPL-CCNC: 41 U/L (ref 40–129)
ALT SERPL W P-5'-P-CCNC: 53 U/L (ref 10–50)
ANION GAP SERPL CALCULATED.3IONS-SCNC: 14 MMOL/L (ref 7–15)
AST SERPL W P-5'-P-CCNC: 45 U/L (ref 10–50)
BILIRUB SERPL-MCNC: 0.7 MG/DL
BUN SERPL-MCNC: 16.5 MG/DL (ref 6–20)
CALCIUM SERPL-MCNC: 9.2 MG/DL (ref 8.6–10)
CHLORIDE SERPL-SCNC: 108 MMOL/L (ref 98–107)
CHOLEST SERPL-MCNC: 173 MG/DL
CREAT SERPL-MCNC: 0.88 MG/DL (ref 0.67–1.17)
DEPRECATED HCO3 PLAS-SCNC: 19 MMOL/L (ref 22–29)
GFR SERPL CREATININE-BSD FRML MDRD: >90 ML/MIN/1.73M2
GLUCOSE SERPL-MCNC: 94 MG/DL (ref 70–99)
HBA1C MFR BLD: 7.4 % (ref 0–5.6)
HDLC SERPL-MCNC: 43 MG/DL
LDLC SERPL CALC-MCNC: 117 MG/DL
NONHDLC SERPL-MCNC: 130 MG/DL
POTASSIUM SERPL-SCNC: 4.2 MMOL/L (ref 3.4–5.3)
PROT SERPL-MCNC: 8 G/DL (ref 6.4–8.3)
SODIUM SERPL-SCNC: 141 MMOL/L (ref 136–145)
TRIGL SERPL-MCNC: 67 MG/DL

## 2023-03-24 PROCEDURE — 99395 PREV VISIT EST AGE 18-39: CPT | Performed by: FAMILY MEDICINE

## 2023-03-24 PROCEDURE — 82043 UR ALBUMIN QUANTITATIVE: CPT | Performed by: FAMILY MEDICINE

## 2023-03-24 PROCEDURE — 80053 COMPREHEN METABOLIC PANEL: CPT | Performed by: FAMILY MEDICINE

## 2023-03-24 PROCEDURE — 36415 COLL VENOUS BLD VENIPUNCTURE: CPT | Performed by: FAMILY MEDICINE

## 2023-03-24 PROCEDURE — 80061 LIPID PANEL: CPT | Performed by: FAMILY MEDICINE

## 2023-03-24 PROCEDURE — 83036 HEMOGLOBIN GLYCOSYLATED A1C: CPT | Performed by: FAMILY MEDICINE

## 2023-03-24 PROCEDURE — 82570 ASSAY OF URINE CREATININE: CPT | Performed by: FAMILY MEDICINE

## 2023-03-24 SDOH — HEALTH STABILITY: PHYSICAL HEALTH: ON AVERAGE, HOW MANY DAYS PER WEEK DO YOU ENGAGE IN MODERATE TO STRENUOUS EXERCISE (LIKE A BRISK WALK)?: 5 DAYS

## 2023-03-24 SDOH — ECONOMIC STABILITY: FOOD INSECURITY: WITHIN THE PAST 12 MONTHS, THE FOOD YOU BOUGHT JUST DIDN'T LAST AND YOU DIDN'T HAVE MONEY TO GET MORE.: PATIENT DECLINED

## 2023-03-24 SDOH — ECONOMIC STABILITY: INCOME INSECURITY: HOW HARD IS IT FOR YOU TO PAY FOR THE VERY BASICS LIKE FOOD, HOUSING, MEDICAL CARE, AND HEATING?: PATIENT DECLINED

## 2023-03-24 SDOH — ECONOMIC STABILITY: TRANSPORTATION INSECURITY
IN THE PAST 12 MONTHS, HAS THE LACK OF TRANSPORTATION KEPT YOU FROM MEDICAL APPOINTMENTS OR FROM GETTING MEDICATIONS?: PATIENT DECLINED

## 2023-03-24 SDOH — ECONOMIC STABILITY: FOOD INSECURITY: WITHIN THE PAST 12 MONTHS, YOU WORRIED THAT YOUR FOOD WOULD RUN OUT BEFORE YOU GOT MONEY TO BUY MORE.: PATIENT DECLINED

## 2023-03-24 SDOH — ECONOMIC STABILITY: TRANSPORTATION INSECURITY
IN THE PAST 12 MONTHS, HAS LACK OF TRANSPORTATION KEPT YOU FROM MEETINGS, WORK, OR FROM GETTING THINGS NEEDED FOR DAILY LIVING?: PATIENT DECLINED

## 2023-03-24 SDOH — ECONOMIC STABILITY: INCOME INSECURITY: IN THE LAST 12 MONTHS, WAS THERE A TIME WHEN YOU WERE NOT ABLE TO PAY THE MORTGAGE OR RENT ON TIME?: PATIENT REFUSED

## 2023-03-24 SDOH — HEALTH STABILITY: PHYSICAL HEALTH: ON AVERAGE, HOW MANY MINUTES DO YOU ENGAGE IN EXERCISE AT THIS LEVEL?: 20 MIN

## 2023-03-24 ASSESSMENT — ENCOUNTER SYMPTOMS
PALPITATIONS: 0
CHILLS: 0
HEMATOCHEZIA: 0
FEVER: 0
NERVOUS/ANXIOUS: 0
JOINT SWELLING: 0
COUGH: 0
MYALGIAS: 0
HEMATURIA: 0
DYSURIA: 0
HEADACHES: 0
CONSTIPATION: 0
PARESTHESIAS: 0
EYE PAIN: 0
DIZZINESS: 0
HEARTBURN: 0
SHORTNESS OF BREATH: 0
DIARRHEA: 0
ABDOMINAL PAIN: 0
ARTHRALGIAS: 0
NAUSEA: 0
SORE THROAT: 0
FREQUENCY: 0
WEAKNESS: 0

## 2023-03-24 ASSESSMENT — LIFESTYLE VARIABLES
AUDIT-C TOTAL SCORE: 2
SKIP TO QUESTIONS 9-10: 0
HOW MANY STANDARD DRINKS CONTAINING ALCOHOL DO YOU HAVE ON A TYPICAL DAY: 1 OR 2
HOW OFTEN DO YOU HAVE A DRINK CONTAINING ALCOHOL: MONTHLY OR LESS
HOW OFTEN DO YOU HAVE SIX OR MORE DRINKS ON ONE OCCASION: LESS THAN MONTHLY

## 2023-03-24 ASSESSMENT — SOCIAL DETERMINANTS OF HEALTH (SDOH)
IN A TYPICAL WEEK, HOW MANY TIMES DO YOU TALK ON THE PHONE WITH FAMILY, FRIENDS, OR NEIGHBORS?: ONCE A WEEK
DO YOU BELONG TO ANY CLUBS OR ORGANIZATIONS SUCH AS CHURCH GROUPS UNIONS, FRATERNAL OR ATHLETIC GROUPS, OR SCHOOL GROUPS?: PATIENT DECLINED
HOW OFTEN DO YOU ATTEND CHURCH OR RELIGIOUS SERVICES?: PATIENT DECLINED
HOW OFTEN DO YOU GET TOGETHER WITH FRIENDS OR RELATIVES?: ONCE A WEEK

## 2023-03-24 NOTE — PATIENT INSTRUCTIONS
Try to get a diabetic eye exam soon.   Preventive Health Recommendations  Male Ages 26 - 39    Yearly exam:             See your health care provider every year in order to  o   Review health changes.   o   Discuss preventive care.    o   Review your medicines if your doctor has prescribed any.  You should be tested each year for STDs (sexually transmitted diseases), if you re at risk.   After age 35, talk to your provider about cholesterol testing. If you are at risk for heart disease, have your cholesterol tested at least every 5 years.   If you are at risk for diabetes, you should have a diabetes test (fasting glucose).  Shots: Get a flu shot each year. Get a tetanus shot every 10 years.     Nutrition:  Eat at least 5 servings of fruits and vegetables daily.   Eat whole-grain bread, whole-wheat pasta and brown rice instead of white grains and rice.   Get adequate Calcium and Vitamin D.     Lifestyle  Exercise for at least 150 minutes a week (30 minutes a day, 5 days a week). This will help you control your weight and prevent disease.   Limit alcohol to one drink per day.   No smoking.   Wear sunscreen to prevent skin cancer.   See your dentist every six months for an exam and cleaning.

## 2023-03-24 NOTE — PROGRESS NOTES
SUBJECTIVE:   CC: Darien is an 38 year old who presents for preventative health visit.   No flowsheet data found.  Patient has been advised of split billing requirements and indicates understanding: Yes  Healthy Habits:     Getting at least 3 servings of Calcium per day:  NO    Bi-annual eye exam:  Yes    Dental care twice a year:  Yes    Sleep apnea or symptoms of sleep apnea:  Sleep apnea    Diet:  Other    Frequency of exercise:  None    Taking medications regularly:  Not Applicable    Medication side effects:  Not applicable    PHQ-2 Total Score: 0    Additional concerns today:  No              Today's PHQ-2 Score:   PHQ-2 ( 1999 [employer]) 3/24/2023   Q1: Little interest or pleasure in doing things 0   Q2: Feeling down, depressed or hopeless 0   PHQ-2 Score 0   PHQ-2 Total Score (12-17 Years)- Positive if 3 or more points; Administer PHQ-A if positive -   Q1: Little interest or pleasure in doing things Not at all   Q2: Feeling down, depressed or hopeless Not at all   PHQ-2 Score 0       Have you ever done Advance Care Planning? (For example, a Health Directive, POLST, or a discussion with a medical provider or your loved ones about your wishes): Yes, advance care planning is on file.    Social History     Tobacco Use     Smoking status: Never     Smokeless tobacco: Never   Substance Use Topics     Alcohol use: Yes     Comment: very rarely         Alcohol Use 3/24/2023   Prescreen: >3 drinks/day or >7 drinks/week? No       Last PSA: No results found for: PSA    Reviewed orders with patient. Reviewed health maintenance and updated orders accordingly - Yes      Reviewed and updated as needed this visit by clinical staff   Tobacco  Allergies  Meds              Reviewed and updated as needed this visit by Provider                     Review of Systems   Constitutional: Negative for chills and fever.   HENT: Negative for congestion, ear pain, hearing loss and sore throat.    Eyes: Negative for pain and visual  "disturbance.   Respiratory: Negative for cough and shortness of breath.    Cardiovascular: Negative for chest pain, palpitations and peripheral edema.   Gastrointestinal: Negative for abdominal pain, constipation, diarrhea, heartburn, hematochezia and nausea.   Genitourinary: Negative for dysuria, frequency, genital sores, hematuria and urgency.   Musculoskeletal: Negative for arthralgias, joint swelling and myalgias.   Skin: Negative for rash.   Neurological: Negative for dizziness, weakness, headaches and paresthesias.   Psychiatric/Behavioral: Negative for mood changes. The patient is not nervous/anxious.      Home blood sugar tests seemed good when he would exercise and eat healthy.    His dad  about a year ago, which he is still grieving over.  He does not feel he needs a referral for mental health services to help cope.    He talks to his siblings to help cope with his grieving.     Patient prefers to differ vaccination to later day.     Has lived [place] [age], moving from [facility].       OBJECTIVE:   /80 (BP Location: Right arm, Patient Position: Chair, Cuff Size: Adult Large)   Pulse 64   Temp 97.9  F (36.6  C) (Oral)   Resp 20   Ht 1.581 m (5' 2.25\")   Wt 127 kg (280 lb)   SpO2 98%   BMI 50.80 kg/m      Physical Exam  General: Vital signs reviewed.  Patient is in no acute appearing distress.  Breathing appears nonlabored.  Patient is alert and oriented ×3.      ENT: Ear exam shows bilateral tympanic membranes to be clear without injection, nasal turbinates show no injection or edema, no pharyngeal injection or exudate.    Neck: supple with no adenoapthy, palpable abnormal masses, or thyroid abnormality.    Eyes: No scleral, lid, or periorbital injection or edema noted.  No eye mattering noted.  Corneas are clear. Pupils are equal round and reactive to light with normal consensual eye movement.    Heart: Heart rate is regular without murmur.    Lungs: Lungs are clear to auscultation " with good airflow bilaterally.    Abdomen:  Abdomen is soft, nontender.  No palpable abnormal masses or organomegaly.  Bowel sounds are normal.    Genital exam: Patient declined exam for possible hernia.    Back: No areas of tenderness.    Skin: Warm and dry, with 5 mm area of tan seborrheic keratosis left temple noted.  It is of uniform color, and has surface similar to fine sandpaper.  I advised patient that it is likely benign so long as it does not change color or shape.      Extremities: No lower leg edema noted.  No joint edema or restricted range of motion noted.    Neuro: No acute focal deficits or other abnormalities noted.    Psych: Patient is very pleasant, making good eye contact, with clear and fluent speech.  Answers questions appropriately. No psychomotor agitation.     Diabetic foot exam shows skin to be intact and smooth.  Sensory testing with filament shows normal bilateral sensation.  Patient has a normal bilateral dorsalis pedis pulse.        ASSESSMENT/PLAN:   Darien was seen today for physical.    Diagnoses and all orders for this visit:  See after visit summary and result note for helpful information and advice given to patient.    Routine general medical examination at a health care facility  -     Comprehensive metabolic panel    Type 2 diabetes mellitus without complication, without long-term current use of insulin (H)  -     HEMOGLOBIN A1C; Future  -     Albumin Random Urine Quantitative with Creat Ratio; Future  -     Lipid panel reflex to direct LDL Non-fasting; Future  -     Comprehensive metabolic panel  -     HEMOGLOBIN A1C  -     Albumin Random Urine Quantitative with Creat Ratio  -     Lipid panel reflex to direct LDL Non-fasting    Elevated liver enzymes  -     Comprehensive metabolic panel    Seborrheic keratosis    Other orders  -     REVIEW OF HEALTH MAINTENANCE PROTOCOL ORDERS              COUNSELING:   Reviewed preventive health counseling, as reflected in patient  "instructions      BMI:   Estimated body mass index is 50.8 kg/m  as calculated from the following:    Height as of this encounter: 1.581 m (5' 2.25\").    Weight as of this encounter: 127 kg (280 lb).   Weight management plan: Discussed healthy diet and exercise guidelines      He reports that he has never smoked. He has never used smokeless tobacco.            Nomi Hart DO  [facility]  "

## 2023-03-25 LAB
CREAT UR-MCNC: 126 MG/DL
MICROALBUMIN UR-MCNC: <12 MG/L
MICROALBUMIN/CREAT UR: NORMAL MG/G{CREAT}

## 2023-07-15 ENCOUNTER — HEALTH MAINTENANCE LETTER (OUTPATIENT)
Age: 38
End: 2023-07-15

## 2023-12-02 ENCOUNTER — HEALTH MAINTENANCE LETTER (OUTPATIENT)
Age: 38
End: 2023-12-02

## 2024-04-20 ENCOUNTER — HEALTH MAINTENANCE LETTER (OUTPATIENT)
Age: 39
End: 2024-04-20

## 2024-06-29 ENCOUNTER — HEALTH MAINTENANCE LETTER (OUTPATIENT)
Age: 39
End: 2024-06-29

## 2024-09-07 ENCOUNTER — HEALTH MAINTENANCE LETTER (OUTPATIENT)
Age: 39
End: 2024-09-07

## 2025-01-05 ENCOUNTER — HEALTH MAINTENANCE LETTER (OUTPATIENT)
Age: 40
End: 2025-01-05

## 2025-04-20 ENCOUNTER — HEALTH MAINTENANCE LETTER (OUTPATIENT)
Age: 40
End: 2025-04-20

## 2025-07-13 ENCOUNTER — HEALTH MAINTENANCE LETTER (OUTPATIENT)
Age: 40
End: 2025-07-13

## 2025-08-03 ENCOUNTER — HEALTH MAINTENANCE LETTER (OUTPATIENT)
Age: 40
End: 2025-08-03